# Patient Record
Sex: FEMALE | Race: WHITE | Employment: UNEMPLOYED | URBAN - METROPOLITAN AREA
[De-identification: names, ages, dates, MRNs, and addresses within clinical notes are randomized per-mention and may not be internally consistent; named-entity substitution may affect disease eponyms.]

---

## 2017-09-18 ENCOUNTER — HOSPITAL ENCOUNTER (INPATIENT)
Age: 32
LOS: 2 days | Discharge: HOME | DRG: 378 | End: 2017-09-20
Attending: EMERGENCY MEDICINE | Admitting: INTERNAL MEDICINE
Payer: SELF-PAY

## 2017-09-18 ENCOUNTER — APPOINTMENT (OUTPATIENT)
Dept: GENERAL RADIOLOGY | Age: 32
DRG: 378 | End: 2017-09-18
Attending: EMERGENCY MEDICINE
Payer: SELF-PAY

## 2017-09-18 PROBLEM — K92.2 UGIB (UPPER GASTROINTESTINAL BLEED): Status: ACTIVE | Noted: 2017-09-18

## 2017-09-18 PROBLEM — D64.9 ANEMIA: Status: ACTIVE | Noted: 2017-09-18

## 2017-09-18 PROBLEM — R55 SYNCOPE: Status: ACTIVE | Noted: 2017-09-18

## 2017-09-18 LAB
ALBUMIN SERPL-MCNC: 2.6 G/DL (ref 3.4–5)
ALBUMIN/GLOB SERPL: 0.9 {RATIO} (ref 0.8–1.7)
ALP SERPL-CCNC: 78 U/L (ref 45–117)
ALT SERPL-CCNC: 18 U/L (ref 13–56)
ANION GAP SERPL CALC-SCNC: 7 MMOL/L (ref 3–18)
AST SERPL-CCNC: 13 U/L (ref 15–37)
BASOPHILS # BLD: 0.1 K/UL (ref 0–0.1)
BASOPHILS # BLD: 0.1 K/UL (ref 0–0.1)
BASOPHILS NFR BLD: 1 % (ref 0–2)
BASOPHILS NFR BLD: 1 % (ref 0–2)
BILIRUB SERPL-MCNC: 0.2 MG/DL (ref 0.2–1)
BUN SERPL-MCNC: 24 MG/DL (ref 7–18)
BUN/CREAT SERPL: 27 (ref 12–20)
CALCIUM SERPL-MCNC: 7.4 MG/DL (ref 8.5–10.1)
CHLORIDE SERPL-SCNC: 107 MMOL/L (ref 100–108)
CO2 SERPL-SCNC: 25 MMOL/L (ref 21–32)
CREAT SERPL-MCNC: 0.9 MG/DL (ref 0.6–1.3)
DIFFERENTIAL METHOD BLD: ABNORMAL
DIFFERENTIAL METHOD BLD: ABNORMAL
EOSINOPHIL # BLD: 0.1 K/UL (ref 0–0.4)
EOSINOPHIL # BLD: 0.2 K/UL (ref 0–0.4)
EOSINOPHIL NFR BLD: 1 % (ref 0–5)
EOSINOPHIL NFR BLD: 2 % (ref 0–5)
ERYTHROCYTE [DISTWIDTH] IN BLOOD BY AUTOMATED COUNT: 14.8 % (ref 11.6–14.5)
ERYTHROCYTE [DISTWIDTH] IN BLOOD BY AUTOMATED COUNT: 14.9 % (ref 11.6–14.5)
GLOBULIN SER CALC-MCNC: 3 G/DL (ref 2–4)
GLUCOSE SERPL-MCNC: 104 MG/DL (ref 74–99)
HCG UR QL: NEGATIVE
HCT VFR BLD AUTO: 20.8 % (ref 35–45)
HCT VFR BLD AUTO: 21.1 % (ref 35–45)
HCT VFR BLD AUTO: 21.5 % (ref 35–45)
HGB BLD-MCNC: 6.9 G/DL (ref 12–16)
HGB BLD-MCNC: 6.9 G/DL (ref 12–16)
HGB BLD-MCNC: 7 G/DL (ref 12–16)
LYMPHOCYTES # BLD: 2.6 K/UL (ref 0.9–3.6)
LYMPHOCYTES # BLD: 2.9 K/UL (ref 0.9–3.6)
LYMPHOCYTES NFR BLD: 21 % (ref 21–52)
LYMPHOCYTES NFR BLD: 27 % (ref 21–52)
MCH RBC QN AUTO: 27.6 PG (ref 24–34)
MCH RBC QN AUTO: 27.8 PG (ref 24–34)
MCHC RBC AUTO-ENTMCNC: 32.6 G/DL (ref 31–37)
MCHC RBC AUTO-ENTMCNC: 32.7 G/DL (ref 31–37)
MCV RBC AUTO: 84.4 FL (ref 74–97)
MCV RBC AUTO: 85.3 FL (ref 74–97)
MONOCYTES # BLD: 0.3 K/UL (ref 0.05–1.2)
MONOCYTES # BLD: 0.4 K/UL (ref 0.05–1.2)
MONOCYTES NFR BLD: 2 % (ref 3–10)
MONOCYTES NFR BLD: 4 % (ref 3–10)
NEUTS SEG # BLD: 10.7 K/UL (ref 1.8–8)
NEUTS SEG # BLD: 6.6 K/UL (ref 1.8–8)
NEUTS SEG NFR BLD: 66 % (ref 40–73)
NEUTS SEG NFR BLD: 75 % (ref 40–73)
PLATELET # BLD AUTO: 53 K/UL (ref 135–420)
PLATELET # BLD AUTO: 54 K/UL (ref 135–420)
PMV BLD AUTO: 11.5 FL (ref 9.2–11.8)
PMV BLD AUTO: 11.5 FL (ref 9.2–11.8)
POTASSIUM SERPL-SCNC: 3.4 MMOL/L (ref 3.5–5.5)
PROT SERPL-MCNC: 5.6 G/DL (ref 6.4–8.2)
RBC # BLD AUTO: 2.5 M/UL (ref 4.2–5.3)
RBC # BLD AUTO: 2.52 M/UL (ref 4.2–5.3)
SODIUM SERPL-SCNC: 139 MMOL/L (ref 136–145)
WBC # BLD AUTO: 14.1 K/UL (ref 4.6–13.2)
WBC # BLD AUTO: 9.9 K/UL (ref 4.6–13.2)

## 2017-09-18 PROCEDURE — 93005 ELECTROCARDIOGRAM TRACING: CPT

## 2017-09-18 PROCEDURE — 80053 COMPREHEN METABOLIC PANEL: CPT | Performed by: EMERGENCY MEDICINE

## 2017-09-18 PROCEDURE — 86900 BLOOD TYPING SEROLOGIC ABO: CPT | Performed by: EMERGENCY MEDICINE

## 2017-09-18 PROCEDURE — 74011250636 HC RX REV CODE- 250/636: Performed by: EMERGENCY MEDICINE

## 2017-09-18 PROCEDURE — 81025 URINE PREGNANCY TEST: CPT | Performed by: EMERGENCY MEDICINE

## 2017-09-18 PROCEDURE — C9113 INJ PANTOPRAZOLE SODIUM, VIA: HCPCS | Performed by: EMERGENCY MEDICINE

## 2017-09-18 PROCEDURE — 99285 EMERGENCY DEPT VISIT HI MDM: CPT

## 2017-09-18 PROCEDURE — 85018 HEMOGLOBIN: CPT | Performed by: INTERNAL MEDICINE

## 2017-09-18 PROCEDURE — 65660000000 HC RM CCU STEPDOWN

## 2017-09-18 PROCEDURE — 85025 COMPLETE CBC W/AUTO DIFF WBC: CPT | Performed by: EMERGENCY MEDICINE

## 2017-09-18 PROCEDURE — 71010 XR CHEST PORT: CPT

## 2017-09-18 PROCEDURE — 86920 COMPATIBILITY TEST SPIN: CPT | Performed by: EMERGENCY MEDICINE

## 2017-09-18 RX ORDER — DEXTROAMPHETAMINE SACCHARATE, AMPHETAMINE ASPARTATE, DEXTROAMPHETAMINE SULFATE AND AMPHETAMINE SULFATE 7.5; 7.5; 7.5; 7.5 MG/1; MG/1; MG/1; MG/1
30 TABLET ORAL DAILY
Status: DISCONTINUED | OUTPATIENT
Start: 2017-09-19 | End: 2017-09-19

## 2017-09-18 RX ORDER — SODIUM CHLORIDE 0.9 % (FLUSH) 0.9 %
5-10 SYRINGE (ML) INJECTION EVERY 8 HOURS
Status: DISCONTINUED | OUTPATIENT
Start: 2017-09-18 | End: 2017-09-20 | Stop reason: HOSPADM

## 2017-09-18 RX ORDER — SODIUM CHLORIDE 0.9 % (FLUSH) 0.9 %
5-10 SYRINGE (ML) INJECTION AS NEEDED
Status: DISCONTINUED | OUTPATIENT
Start: 2017-09-18 | End: 2017-09-20 | Stop reason: HOSPADM

## 2017-09-18 RX ORDER — PANTOPRAZOLE SODIUM 40 MG/10ML
40 INJECTION, POWDER, LYOPHILIZED, FOR SOLUTION INTRAVENOUS
Status: COMPLETED | OUTPATIENT
Start: 2017-09-18 | End: 2017-09-18

## 2017-09-18 RX ORDER — ONDANSETRON 2 MG/ML
4 INJECTION INTRAMUSCULAR; INTRAVENOUS
Status: DISCONTINUED | OUTPATIENT
Start: 2017-09-18 | End: 2017-09-20 | Stop reason: HOSPADM

## 2017-09-18 RX ORDER — VENLAFAXINE HYDROCHLORIDE 75 MG/1
75 CAPSULE, EXTENDED RELEASE ORAL
Status: DISCONTINUED | OUTPATIENT
Start: 2017-09-19 | End: 2017-09-20 | Stop reason: HOSPADM

## 2017-09-18 RX ADMIN — Medication 10 ML: at 22:00

## 2017-09-18 RX ADMIN — PANTOPRAZOLE SODIUM 40 MG: 40 INJECTION, POWDER, FOR SOLUTION INTRAVENOUS at 20:45

## 2017-09-18 NOTE — IP AVS SNAPSHOT
[address] Patient: Kristine Garcia MRN: [id number] :1985 Current Discharge Medication List  
  
START taking these medications Dose & Instructions Dispensing Information Comments Morning Noon Evening Bedtime  
 ascorbic acid (vitamin C) 500 mg tablet Commonly known as:  VITAMIN C Your last dose was: Your next dose is:    
   
   
 Dose:  500 mg Take 1 Tab by mouth daily for 30 days. Quantity:  30 Tab Refills:  0  
     
   
   
   
  
 ferrous sulfate 325 mg (65 mg iron) tablet Your last dose was: Your next dose is:    
   
   
 Dose:  325 mg Take 1 Tab by mouth two (2) times daily (with meals) for 30 days. Quantity:  60 Tab Refills:  0  
     
   
   
   
  
 * OTHER Your last dose was: Your next dose is: CBC in 1 week Dx: Fax results to Dr. Monik Estrella Quantity:  1 Each Refills:  0  
     
   
   
   
  
 * OTHER Your last dose was: Your next dose is: This is to certify that Kristine Garcia was admitted to [facility] from 2017 to 17. He may may return to work on 17. Please feel free to contact my office if you have any questions or concerns. Thank you. Quantity:  1 Each Refills:  0  
     
   
   
   
  
 pantoprazole 40 mg tablet Commonly known as:  PROTONIX Your last dose was: Your next dose is:    
   
   
 Dose:  40 mg Take 1 Tab by mouth Before breakfast and dinner. Quantity:  60 Tab Refills:  0  
     
   
   
   
  
 sucralfate 100 mg/mL suspension Commonly known as:  Allyson Gum Your last dose was: Your next dose is:    
   
   
 Dose:  1 g Take 10 mL by mouth four (4) times daily. Quantity:  414 mL Refills:  2  
     
   
   
   
  
 * Notice:   This list has 2 medication(s) that are the same as other medications prescribed for you. Read the directions carefully, and ask your doctor or other care provider to review them with you. CONTINUE these medications which have NOT CHANGED Dose & Instructions Dispensing Information Comments Morning Noon Evening Bedtime ADDERALL 30 mg tablet Generic drug:  dextroamphetamine-amphetamine Your last dose was: Your next dose is:    
   
   
 Dose:  30 mg Take 30 mg by mouth. Refills:  0  
     
   
   
   
  
 venlafaxine-SR 75 mg capsule Commonly known as:  EFFEXOR-XR Your last dose was: Your next dose is:    
   
   
  Refills:  5 VITAMIN D3 2,000 unit Tab Generic drug:  cholecalciferol (vitamin D3) Your last dose was: Your next dose is: Take  by mouth. Refills:  0 Where to Get Your Medications Information on where to get these meds will be given to you by the nurse or doctor. ! Ask your nurse or doctor about these medications  
  ascorbic acid (vitamin C) 500 mg tablet  
 ferrous sulfate 325 mg (65 mg iron) tablet OTHER  
 OTHER  
 pantoprazole 40 mg tablet  
 sucralfate 100 mg/mL suspension

## 2017-09-18 NOTE — ED PROVIDER NOTES
HPI Comments: Kacie Crowder is a 27 y.o. Female who presents to the ED with c/o dizziness at work earlier today. Patient states a patient upset her at work when she began experiencing palpitations. Notes she walked to her car to calm herself down when she realized she had left the keys in her office and had a near-syncopal event as she was walking back to retrieve her keys. Denies LOC. Also reports diaphoresis and states she had difficulty keeping her eyes open. Denies CP, SOB, abdominal pain or hx of cardiac dz. Denies FMHx of sudden cardiac death. No other symptoms or concerns were expressed. The history is provided by the patient. Past Medical History:   Diagnosis Date    Contact dermatitis and other eczema, due to unspecified cause 2011    psoriasis    Dental caries     Psychiatric disorder     ADHD       Past Surgical History:   Procedure Laterality Date    HX CHOLECYSTECTOMY  2014    gallstones    HX GYN  2011    LEEP for mild displasia         Family History:   Problem Relation Age of Onset    Stroke Mother     Hypertension Mother     Elevated Lipids Mother     Dementia Mother     Hypertension Father     Elevated Lipids Father     No Known Problems Maternal Grandmother     No Known Problems Maternal Grandfather     No Known Problems Paternal Grandmother     No Known Problems Paternal Grandfather        Social History     Social History    Marital status:      Spouse name: N/A    Number of children: N/A    Years of education: N/A     Occupational History    Not on file.      Social History Main Topics    Smoking status: Current Every Day Smoker     Packs/day: 0.25     Years: 17.00    Smokeless tobacco: Never Used    Alcohol use Yes      Comment: social    Drug use: No    Sexual activity: Yes     Partners: Male     Birth control/ protection: None     Other Topics Concern    Not on file     Social History Narrative         ALLERGIES: Penicillins    Review of Systems Constitutional: Positive for diaphoresis. Respiratory: Negative for shortness of breath. Cardiovascular: Negative for chest pain. Gastrointestinal: Negative for abdominal pain. Neurological: Positive for dizziness. Vitals:    09/18/17 1412   BP: 124/84   Pulse: (!) 102   Resp: 18   Temp: 97.3 °F (36.3 °C)   SpO2: 100%            Physical Exam   Constitutional: She is oriented to person, place, and time. She appears well-developed. HENT:   Head: Normocephalic and atraumatic. Eyes: EOM are normal. Pupils are equal, round, and reactive to light. Neck: Normal range of motion. Neck supple. Cardiovascular: Normal rate, regular rhythm and normal heart sounds. Exam reveals no friction rub. No murmur heard. Pulmonary/Chest: Effort normal and breath sounds normal. No respiratory distress. She has no wheezes. Abdominal: Soft. She exhibits no distension. There is no tenderness. There is no rebound and no guarding. Musculoskeletal: Normal range of motion. Neurological: She is alert and oriented to person, place, and time. Skin: Skin is warm and dry. Psychiatric: She has a normal mood and affect. Her behavior is normal. Thought content normal.        MDM  Number of Diagnoses or Management Options  Diagnosis management comments:   EKG showed sinus rhythm with normal axis normal intervals no ST elevation or depression or hypertrophy. 27year-old female presyncope. He had some prodromal diaphoresis lightheadedness. No significant chest pain service with abdominal pain or headache. No fall. Hg 7. Last blood work 2015. Platelet 53. Discussed the patient she denies any heavy periods. She does still get her periods. She did state that last week she had some abdominal pain and 2 days later was followed by some very dark and tarry stool was also possible causes. Tarry stool stopped. Repeat labs no change. No transfusion at this time.   obs and trend hg.     D/w Divina, he did reqeust Transfusion. I spoke with the patient,  She walks without any problems or weakness. She does her activities of daily living  without difficulty;  syncope may be related to presence of a vasovagal event. I do think she is having symptomatic anemia. Will hold on transfer . rectal: trace stool Heme +;     ED Course       Procedures        Scribe Attestation      Jana James acting as a scribe for and in the presence of Ronal Santillan MD      September 18, 2017 at 2:19 PM       Provider Attestation:      I personally performed the services described in the documentation, reviewed the documentation, as recorded by the scribe in my presence, and it accurately and completely records my words and actions.  September 18, 2017 at 2:19 PM - Ronal Santillan MD

## 2017-09-18 NOTE — IP AVS SNAPSHOT
[address] Patient: Silvia Hannah MRN: [id number] :1985 You are allergic to the following Allergen Reactions Penicillins Hives Recent Documentation Height Weight Breastfeeding? BMI OB Status Smoking Status 1.651 m 102.5 kg No 37.61 kg/m2 Having regular periods Current Every Day Smoker Unresulted Labs Order Current Status PERIPHERAL SMEAR In process TYPE & SCREEN Preliminary result Emergency Contacts Name Discharge Info Relation Home Work Mobile Cam Talbert DISCHARGE CAREGIVER [3] Friend [5] [phone] About your hospitalization You were admitted on:  2017 You last received care in the:  [address] [place] [address] You were discharged on:  2017 Unit phone number:  [phone] Why you were hospitalized Your primary diagnosis was:  Not on File Your diagnoses also included:  Syncope, Anemia, Ugib (Upper Gastrointestinal Bleed) Providers Seen During Your Hospitalizations Provider Role Specialty Primary office phone Barbaraann Mortimer, MD Attending Provider Emergency Medicine [phone] Kelle Walters MD Attending Provider Internal Medicine [phone] Jabari Owusu MD Attending Provider [facility] [phone] Your Primary Care Physician (PCP) Primary Care Physician Office Phone Office Fax NONE ** None ** ** None ** Follow-up Information Follow up With Details Comments Contact Info Deyanira Madison MD Schedule an appointment as soon as possible for a visit on 10/26/2017 @0940am [address] [place] [address] Drive [address] [facility] [address] 
[phone] Jordyn Lee MD Schedule an appointment as soon as possible for a visit on 10/23/2017 @1045AM  27 [facility] [address] [facility] [address] [phone] Francisco Arriaga NP On 9/27/2017 @0930am 16 [facility] [address] [address] 
[phone] Your Appointments Wednesday September 27, 2017 10:00 AM EDT New Patient with Francisco Arriaga  Aldo Wang (XIMENA Yoder)  
 16 [facility] [address] [id number] [phone] Current Discharge Medication List  
  
START taking these medications Dose & Instructions Dispensing Information Comments Morning Noon Evening Bedtime  
 ascorbic acid (vitamin C) 500 mg tablet Commonly known as:  VITAMIN C Your last dose was: Your next dose is:    
   
   
 Dose:  500 mg Take 1 Tab by mouth daily for 30 days. Quantity:  30 Tab Refills:  0  
     
   
   
   
  
 ferrous sulfate 325 mg (65 mg iron) tablet Your last dose was: Your next dose is:    
   
   
 Dose:  325 mg Take 1 Tab by mouth two (2) times daily (with meals) for 30 days. Quantity:  60 Tab Refills:  0  
     
   
   
   
  
 * OTHER Your last dose was: Your next dose is: CBC in 1 week Dx: Fax results to Dr. Lori Richard Quantity:  1 Each Refills:  0  
     
   
   
   
  
 * OTHER Your last dose was: Your next dose is: This is to certify that Eula Velasquez was admitted to [facility] from 9/18/2017 to 09/20/17. He may may return to work on 9/22/17. Please feel free to contact my office if you have any questions or concerns. Thank you. Quantity:  1 Each Refills:  0  
     
   
   
   
  
 pantoprazole 40 mg tablet Commonly known as:  PROTONIX Your last dose was: Your next dose is:    
   
   
 Dose:  40 mg Take 1 Tab by mouth Before breakfast and dinner. Quantity:  60 Tab Refills:  0  
     
   
   
   
  
 sucralfate 100 mg/mL suspension Commonly known as:  Doreatha Fede Your last dose was: Your next dose is:    
   
   
 Dose:  1 g Take 10 mL by mouth four (4) times daily. Quantity:  414 mL Refills:  2  
     
   
   
   
  
 * Notice: This list has 2 medication(s) that are the same as other medications prescribed for you. Read the directions carefully, and ask your doctor or other care provider to review them with you. CONTINUE these medications which have NOT CHANGED Dose & Instructions Dispensing Information Comments Morning Noon Evening Bedtime ADDERALL 30 mg tablet Generic drug:  dextroamphetamine-amphetamine Your last dose was: Your next dose is:    
   
   
 Dose:  30 mg Take 30 mg by mouth. Refills:  0  
     
   
   
   
  
 venlafaxine-SR 75 mg capsule Commonly known as:  EFFEXOR-XR Your last dose was: Your next dose is:    
   
   
  Refills:  5 VITAMIN D3 2,000 unit Tab Generic drug:  cholecalciferol (vitamin D3) Your last dose was: Your next dose is: Take  by mouth. Refills:  0 Where to Get Your Medications Information on where to get these meds will be given to you by the nurse or doctor. ! Ask your nurse or doctor about these medications  
  ascorbic acid (vitamin C) 500 mg tablet  
 ferrous sulfate 325 mg (65 mg iron) tablet OTHER  
 OTHER  
 pantoprazole 40 mg tablet  
 sucralfate 100 mg/mL suspension Discharge Instructions Anemia: Care Instructions Your Care Instructions Anemia is a low level of red blood cells, which carry oxygen throughout your body. Many things can cause anemia. Lack of iron is one of the most common causes. Your body needs iron to make hemoglobin, a substance in red blood cells that carries oxygen from the lungs to your body's cells. Without enough iron, the body produces fewer and smaller red blood cells. As a result, your body's cells do not get enough oxygen, and you feel tired and weak. And you may have trouble concentrating. Bleeding is the most common cause of a lack of iron. You may have heavy menstrual bleeding or bleeding caused by conditions such as ulcers, hemorrhoids, or cancer. Regular use of aspirin or other anti-inflammatory medicines (such as ibuprofen) also can cause bleeding in some people. A lack of iron in your diet also can cause anemia, especially at times when the body needs more iron, such as during pregnancy, infancy, and the teen years. Your doctor may have prescribed iron pills. It may take several months of treatment for your iron levels to return to normal. Your doctor also may suggest that you eat foods that are rich in iron, such as meat and beans. There are many other causes of anemia. It is not always due to a lack of iron. Finding the specific cause of your anemia will help your doctor find the right treatment for you. Follow-up care is a key part of your treatment and safety. Be sure to make and go to all appointments, and call your doctor if you are having problems. It's also a good idea to know your test results and keep a list of the medicines you take. How can you care for yourself at home? · Take your medicines exactly as prescribed. Call your doctor if you think you are having a problem with your medicine. · If your doctor recommends iron pills, take them as directed: ¨ Try to take the pills on an empty stomach about 1 hour before or 2 hours after meals. But you may need to take iron with food to avoid an upset stomach. ¨ Do not take antacids or drink milk or caffeine drinks (such as coffee, tea, or cola) at the same time or within 2 hours of the time that you take your iron. They can make it hard for your body to absorb the iron. ¨ Vitamin C (from food or supplements) helps your body absorb iron.  Try taking iron pills with a glass of orange juice or some other food that is high in vitamin C, such as citrus fruits. ¨ Iron pills may cause stomach problems, such as heartburn, nausea, diarrhea, constipation, and cramps. Be sure to drink plenty of fluids, and include fruits, vegetables, and fiber in your diet each day. Iron pills often make your bowel movements dark or green. ¨ If you forget to take an iron pill, do not take a double dose of iron the next time you take a pill. ¨ Keep iron pills out of the reach of small children. An overdose of iron can be very dangerous. · Follow your doctor's advice about eating iron-rich foods. These include red meat, shellfish, poultry, eggs, beans, raisins, whole-grain bread, and leafy green vegetables. · Steam vegetables to help them keep their iron content. When should you call for help? Call 911 anytime you think you may need emergency care. For example, call if: 
· You have symptoms of a heart attack. These may include: ¨ Chest pain or pressure, or a strange feeling in the chest. 
¨ Sweating. ¨ Shortness of breath. ¨ Nausea or vomiting. ¨ Pain, pressure, or a strange feeling in the back, neck, jaw, or upper belly or in one or both shoulders or arms. ¨ Lightheadedness or sudden weakness. ¨ A fast or irregular heartbeat. After you call 911, the  may tell you to chew 1 adult-strength or 2 to 4 low-dose aspirin. Wait for an ambulance. Do not try to drive yourself. · You passed out (lost consciousness). Call your doctor now or seek immediate medical care if: 
· You have new or increased shortness of breath. · You are dizzy or lightheaded, or you feel like you may faint. · Your fatigue and weakness continue or get worse. · You have any abnormal bleeding, such as: 
¨ Nosebleeds. ¨ Vaginal bleeding that is different (heavier, more frequent, at a different time of the month) than what you are used to. ¨ Bloody or black stools, or rectal bleeding. ¨ Bloody or pink urine. Watch closely for changes in your health, and be sure to contact your doctor if: 
· You do not get better as expected. Where can you learn more? Go to http://zeke-néstor.info/. Enter R301 in the search box to learn more about \"Anemia: Care Instructions. \" Current as of: October 13, 2016 Content Version: 11.3 © 20064143-8840 ChinaPNR. Care instructions adapted under license by shopatplaces (which disclaims liability or warranty for this information). If you have questions about a medical condition or this instruction, always ask your healthcare professional. [place] [address] any warranty or liability for your use of this information. Gastrointestinal Bleeding: Care Instructions Your Care Instructions The digestive or gastrointestinal tract goes from the mouth to the anus. It is often called the GI tract. Bleeding can happen anywhere in the GI tract. It may be caused by an ulcer, an infection, or cancer. It may also be caused by medicines such as aspirin or ibuprofen. Light bleeding may not cause any symptoms at first. But if you continue to bleed for a while, you may feel very weak or tired. Sudden, heavy bleeding means you need to see a doctor right away. This kind of bleeding can be very dangerous. But it can usually be cured or controlled. The doctor may do some tests to find the cause of your bleeding. Follow-up care is a key part of your treatment and safety. Be sure to make and go to all appointments, and call your doctor if you are having problems. It's also a good idea to know your test results and keep a list of the medicines you take. How can you care for yourself at home? · Be safe with medicines. Take your medicines exactly as prescribed. Call your doctor if you think you are having a problem with your medicine. You will get more details on the specific medicines your doctor prescribes. · Do not take aspirin or other anti-inflammatory medicines, such as naproxen (Aleve) or ibuprofen (Advil, Motrin), without talking to your doctor first. Ask your doctor if it is okay to use acetaminophen (Tylenol). · Do not drink alcohol. · The bleeding may make you lose iron. So it's important to eat foods that have a lot of iron. These include red meat, shellfish, poultry, and eggs. They also include beans, raisins, whole-grain breads, and leafy green vegetables. If you want help planning meals, you can make an appointment with a dietitian. When should you call for help? Call 911 anytime you think you may need emergency care. For example, call if: 
· You have sudden, severe belly pain. · You vomit blood or what looks like coffee grounds. · You passed out (lost consciousness). · Your stools are maroon or very bloody. Call your doctor now or seek immediate medical care if: 
· You are dizzy or lightheaded, or you feel like you may faint. · Your stools are black and look like tar, or they have streaks of blood. · You have belly pain. · You vomit or have nausea. · You have trouble swallowing, or it hurts when you swallow. Watch closely for changes in your health, and be sure to contact your doctor if: 
· You do not get better as expected. Where can you learn more? Go to http://zeke-néstor.info/. Enter L412 in the search box to learn more about \"Gastrointestinal Bleeding: Care Instructions. \" Current as of: March 20, 2017 Content Version: 11.3 © 4146-4475 WellTek. Care instructions adapted under license by Meetingsbooker.com (which disclaims liability or warranty for this information). If you have questions about a medical condition or this instruction, always ask your healthcare professional. [address] any warranty or liability for your use of this information. Fainting: Care Instructions Your Care Instructions When you faint, or pass out, you lose consciousness for a short time. A brief drop in blood flow to the brain often causes it. When you fall or lie down, more blood flows to your brain and you regain consciousness. Emotional stress, pain, or overheatingespecially if you have been standingcan make you faint. In these cases, fainting is usually not serious. But fainting can be a sign of a more serious problem. Your doctor may want you to have more tests to rule out other causes. The treatment you need depends on the reason why you fainted. The doctor has checked you carefully, but problems can develop later. If you notice any problems or new symptoms, get medical treatment right away. Follow-up care is a key part of your treatment and safety. Be sure to make and go to all appointments, and call your doctor if you are having problems. It's also a good idea to know your test results and keep a list of the medicines you take. How can you care for yourself at home? · Drink plenty of fluids to prevent dehydration. If you have kidney, heart, or liver disease and have to limit fluids, talk with your doctor before you increase your fluid intake. When should you call for help? Call 911 anytime you think you may need emergency care. For example, call if: 
· You have symptoms of a heart problem. These may include: ¨ Chest pain or pressure. ¨ Severe trouble breathing. ¨ A fast or irregular heartbeat. ¨ Lightheadedness or sudden weakness. ¨ Coughing up pink, foamy mucus. ¨ Passing out. After you call 911, the  may tell you to chew 1 adult-strength or 2 to 4 low-dose aspirin. Wait for an ambulance. Do not try to drive yourself. · You have symptoms of a stroke. These may include: 
¨ Sudden numbness, tingling, weakness, or loss of movement in your face, arm, or leg, especially on only one side of your body. ¨ Sudden vision changes. ¨ Sudden trouble speaking. ¨ Sudden confusion or trouble understanding simple statements. ¨ Sudden problems with walking or balance. ¨ A sudden, severe headache that is different from past headaches. · You passed out (lost consciousness) again. Watch closely for changes in your health, and be sure to contact your doctor if: 
· You do not get better as expected. Where can you learn more? Go to http://zeke-néstor.info/. Enter T246 in the search box to learn more about \"Fainting: Care Instructions. \" Current as of: 2017 Content Version: 11.3 © 8426-5285 FolderBoy. Care instructions adapted under license by Pyramid Analytics (which disclaims liability or warranty for this information). If you have questions about a medical condition or this instruction, always ask your healthcare professional. [address] any warranty or liability for your use of this information. Patient armband removed and shredded CoupOptionhart Activation Thank you for requesting access to Design[employer]west Platforms. Please follow the instructions below to securely access and download your online medical record. Design[employer] Platforms allows you to send messages to your doctor, view your test results, renew your prescriptions, schedule appointments, and more. How Do I Sign Up? 1. In your internet browser, go to [email or web address] 
2. Click on the First Time User? Click Here link in the Sign In box. You will be redirect to the New Member Sign Up page. 3. Enter your Design[employer] Platforms Access Code exactly as it appears below. You will not need to use this code after youve completed the sign-up process. If you do not sign up before the expiration date, you must request a new code. [employer] Platforms Access Code: [email or web address] Expires: 2017 11:03 AM (This is the date your [employer] Platform[employer] access code will ) 4.  Enter the last four digits of your Social Security Number (xxxx) and Date of Birth (mm/dd/yyyy) as indicated and click Submit. You will be taken to the next sign-up page. 5. Create a CrowdTangle ID. This will be your CrowdTangle login ID and cannot be changed, so think of one that is secure and easy to remember. 6. Create a CrowdTangle password. You can change your password at any time. 7. Enter your Password Reset Question and Answer. This can be used at a later time if you forget your password. 8. Enter your e-mail address. You will receive e-mail notification when new information is available in [address] 9. Click Sign Up. You can now view and download portions of your medical record. 10. Click the Download Summary menu link to download a portable copy of your medical information. Additional Information If you have questions, please visit the Frequently Asked Questions section of the CrowdTangle website at [email or web address] Remember, CrowdTangle is NOT to be used for urgent needs. For medical emergencies, dial 911. DISCHARGE SUMMARY from Nurse The following personal items are in your possession at time of discharge: 
 
Dental Appliances: None Visual Aid: None Home Medications: None Jewelry: None Clothing: Footwear, Pants, Shirt Other Valuables: Cell Phone PATIENT INSTRUCTIONS: 
 
 
F-face looks uneven A-arms unable to move or move unevenly S-speech slurred or non-existent T-time-call 911 as soon as signs and symptoms begin-DO NOT go Back to bed or wait to see if you get better-TIME IS BRAIN. Warning Signs of HEART ATTACK Call 911 if you have these symptoms: ? Chest discomfort. Most heart attacks involve discomfort in the center of the chest that lasts more than a few minutes, or that goes away and comes back. It can feel like uncomfortable pressure, squeezing, fullness, or pain. ? Discomfort in other areas of the upper body. Symptoms can include pain or discomfort in one or both arms, the back, neck, jaw, or stomach. ? Shortness of breath with or without chest discomfort. ? Other signs may include breaking out in a cold sweat, nausea, or lightheadedness. Don't wait more than five minutes to call [address]! Fast action can save your life. Calling 911 is almost always the fastest way to get lifesaving treatment. Emergency Medical Services staff can begin treatment when they arrive  up to an hour sooner than if someone gets to the hospital by car. The discharge information has been reviewed with the patient. The patient verbalized understanding. Discharge medications reviewed with the patient and appropriate educational materials and side effects teaching were provided. Discharge Instructions Patient: Megan Hoang MRN: [id number]  CSN: [id number] YOB: 1985  Age: 27 y.o. Sex: female DOA: 9/18/2017 LOS:  LOS: 2 days   Discharge Date: DIET:  Regular Diet ACTIVITY: Activity as tolerated ADDITIONAL INFORMATION: If you experience any of the following symptoms but not limited to Fever, chills, nausea, vomiting, diarrhea, change in mentation, falling, bleeding, shortness of breath, chest pain, please call your primary care physician or return to the emergency room if you cannot get hold of your doctor:  
 
FOLLOW UP CARE: 
Dr. Grabiel Carranza in 7-10 days. Please call and set up an appointment. Dr. Emmy Hernandes, hematology in 1 week Dr. Emmy Hernandes, GI in 4 week Cristo Reich MD 
9/20/2017 11:16 AM 
 
 
 
 
 
Discharge Instructions Attachments/References PANTOPRAZOLE (BY MOUTH) (ENGLISH) ASCORBIC ACID (VITAMIN C) (BY MOUTH) (ENGLISH) IRON SUPPLEMENTS (BY MOUTH) (ENGLISH) SUCRALFATE (BY MOUTH) (ENGLISH) Discharge Orders None [facility] Announcement We are excited to announce that we are making your provider's discharge notes available to you in Microarrays. You will see these notes when they are completed and signed by the physician that discharged you from your recent hospital stay. If you have any questions or concerns about any information you see in [employer] Energyt, please call the Health Information Department where you were seen or reach out to your Primary Care Provider for more information about your plan of care. Introducing [facility]! Luis M Singer introduces Microarrays patient portal. Now you can access parts of your medical record, email your doctor's office, and request medication refills online. 1. In your internet browser, go to [email or web address] 2. Click on the First Time User? Click Here link in the Sign In box. You will see the New Member Sign Up page. 3. Enter your Microarrays Access Code exactly as it appears below. You will not need to use this code after youve completed the sign-up process. If you do not sign up before the expiration date, you must request a new code. · Microarrays Access Code: [email or web address] Expires: 12/19/2017 11:03 AM 
 
4. Enter the last four digits of your Social Security Number (xxxx) and Date of Birth (mm/dd/yyyy) as indicated and click Submit. You will be taken to the next sign-up page. 5. Create a Laricina Energyt ID. This will be your Microarrays login ID and cannot be changed, so think of one that is secure and easy to remember. 6. Create a Microarrays password. You can change your password at any time. 7. Enter your Password Reset Question and Answer. This can be used at a later time if you forget your password. 8. Enter your e-mail address.  You will receive e-mail notification when new information is available in [email or web address]. 9. Click Sign Up. You can now view and download portions of your medical record. 10. Click the Download Summary menu link to download a portable copy of your medical information. If you have questions, please visit the Frequently Asked Questions section of the [email or web address] website. Remember, SADAR 3Dt is NOT to be used for urgent needs. For medical emergencies, dial 911. Now available from your iPhone and Android! General Information Please provide this summary of care documentation to your next provider. Patient Signature:  ____________________________________________________________ Date:  ____________________________________________________________  
  
Velma Hua Provider Signature:  ____________________________________________________________ Date:  ____________________________________________________________ More Information Pantoprazole (By mouth) Pantoprazole (pan-TOE-pra-zole) Treats gastroesophageal reflux disease (GERD), a damaged esophagus, and high levels of stomach acid. This medicine is a proton pump inhibitor (PPI). Brand Name(s): Protonix There may be other brand names for this medicine. When This Medicine Should Not Be Used: This medicine is not right for everyone. Do not use it if you had an allergic reaction to pantoprazole or similar medicines. How to Use This Medicine:  
Packet, Tablet, Delayed Release Tablet, Long Acting Tablet · Your doctor will tell you how much medicine to use. Do not use more than directed. Take the medicine at least 30 minutes before a meal. 
· Delayed-release tablet: Swallow the tablet whole. Do not crush, break, or chew it. · Delayed-release packet: ¨ To prepare with applesauce: § Mix the packet contents with 1 teaspoon of applesauce. Do not mix with water, or other liquids or food. Do not divide the packet contents to make smaller doses. § Swallow the mixture within 10 minutes after you mix it. Do not chew or crush the granules. § Sip some water after you take the mixture to make sure you swallow all of the medicine. ¨ To prepare with apple juice: § Mix the packet contents with 1 teaspoon of apple juice in a small cup. Do not divide the packet contents to make smaller doses. § Stir for 5 seconds and drink the mixture immediately. Do not chew or crush the granules. § To make sure you get all of the medicine, add more apple juice to the cup. Drink it immediately. ¨ To prepare for a feeding tube: § Pour the packet contents in a 2-ounce (60 milliliter [mL]) catheter-tip syringe. § Add 10 mL of apple juice to the syringe. Add the mixture to the tube. Gently tap or shake the barrel of the syringe to help empty it. § Add 10 mL of apple juice to the syringe and put it in the tube. Do this at least 2 times. There should be no granules left in the syringe. · This medicine should come with a Medication Guide. Ask your pharmacist for a copy if you do not have one. · Missed dose: Take a dose as soon as you remember. If it is almost time for your next dose, wait until then and take a regular dose. Do not take extra medicine to make up for a missed dose. · Store the medicine in a closed container at room temperature, away from heat, moisture, and direct light. Drugs and Foods to Avoid: Ask your doctor or pharmacist before using any other medicine, including over-the-counter medicines, vitamins, and herbal products. · Some foods and medicines can affect how pantoprazole works. Tell your doctor if you are using any of the following: ¨ Ampicillin, atazanavir, digoxin, erlotinib, ketoconazole, methotrexate, mycophenolate mofetil, nelfinavir ¨ Blood thinner (including warfarin) ¨ Diuretic (water pill) ¨ Iron supplements Warnings While Using This Medicine: · Tell your doctor if you are pregnant or breastfeeding, or if you have liver disease, lupus, or osteoporosis. · This medicine may cause the following problems: ¨ Kidney problems ¨ Low vitamin B12 or magnesium levels ¨ Increased risk of broken bones in the hip, wrist, or spine · This medicine can cause diarrhea. Call your doctor if the diarrhea becomes severe, does not stop, or is bloody. Do not take any medicine to stop diarrhea until you have talked to your doctor. Diarrhea can occur 2 months or more after you stop taking this medicine. · Tell any doctor or dentist who treats you that you are using this medicine. This medicine may affect certain medical test results. · Your doctor will check your progress and the effects of this medicine at regular visits. Keep all appointments. · Keep all medicine out of the reach of children. Never share your medicine with anyone. Possible Side Effects While Using This Medicine:  
Call your doctor right away if you notice any of these side effects: · Allergic reaction: Itching or hives, swelling in your face or hands, swelling or tingling in your mouth or throat, chest tightness, trouble breathing · Blistering, peeling, red skin rash · Fever, joint pain, swelling in your body, unusual weight gain, change in how much or how often you urinate · Joint pain, rash on your cheeks or arms that gets worse in the sun · Seizures, dizziness, uneven heartbeat, muscle cramps or twitching · Severe diarrhea, stomach cramps, fever · Stomach pain, nausea, vomiting, weight loss If you notice other side effects that you think are caused by this medicine, tell your doctor. Call your doctor for medical advice about side effects. You may report side effects to FDA at 8-816-FDA-7918 © 2017 [address] Information is for End User's use only and may not be sold, redistributed or otherwise used for commercial purposes. The above information is an  only.  It is not intended as medical advice for individual conditions or treatments. Talk to your doctor, nurse or pharmacist before following any medical regimen to see if it is safe and effective for you. Ascorbic Acid (Vitamin C) (By mouth) Ascorbic Acid (as-KOREector AS-id) Helps patients who do not have enough vitamin C in the body. It is sometimes used to add acidity to the urine. Brand Name(s): Ascocid, C-500, C-Time w/Gege Hips, [facility] Great Taste Vitamin C, Bud-C, Nature's Blend Vitamin C, One-Gram C, PharmAssure Vitamin C, [facility] Vitamin C, Vitamin C250 There may be other brand names for this medicine. When This Medicine Should Not Be Used: You should not use this medicine if you have had an allergic reaction to ascorbic acid (vitamin C). How to Use This Medicine:  
Tablet, Powder, Chewable Tablet, Long Acting Tablet, Long Acting Capsule, Liquid · Your doctor will tell you how much to take and how often. Always follow the dose instructions on the label if you take this medicine in over-the-counter form. · May be taken with or without food. · Swallow the tablet or capsule whole, do not chew or crush. · Chew the chewable tablet and swallow. · Carefully measure the oral liquid using a marked measuring spoon or medicine cup, or with the dropper that comes with the medicine. This medicine may either be mixed with foods such as cereal and fruit juice, or be dropped into the mouth. If a dose is missed: · Try not to miss any doses; however, it is usually not a problem if you miss one or two doses. How to Store and Dispose of This Medicine: · Store at room temperature in a closed container away from heat, moisture, and light. Do not freeze. · Keep all medicine away from children. Drugs and Foods to Avoid: Ask your doctor or pharmacist before using any other medicine, including over-the-counter medicines, vitamins, and herbal products. · Make sure your doctor knows if you are taking a blood thinner, such as Coumadin®. Warnings While Using This Medicine: · Check with your doctor before taking vitamin C if you are pregnant, breastfeeding, or have any medical problems. · Ascorbic acid may interfere with tests that measure sugar in the urine of patients with diabetes. Possible Side Effects While Using This Medicine: If you notice these less serious side effects, talk with your doctor: · Nausea and vomiting, stomach pain, diarrhea If you notice other side effects that you think are caused by this medicine, tell your doctor. Call your doctor for medical advice about side effects. You may report side effects to FDA at 6-153-FDA-5629 © 2017 [address] Information is for End User's use only and may not be sold, redistributed or otherwise used for commercial purposes. The above information is an  only. It is not intended as medical advice for individual conditions or treatments. Talk to your doctor, nurse or pharmacist before following any medical regimen to see if it is safe and effective for you. Iron Supplements (By mouth) Treats low blood iron or anemia by helping your body make red blood cells. Brand Name(s): Beef/Iron/Wine, Bifera, BiferaRx, Corvite FE, Duofer, EZFE 200, Enfamil Luis F-In-Sol, [facility] Iron Tablets, Fe-20, Femcon Fe, Femiron, Feosol, Luis F-Iron, Ester haute, [place] There may be other brand names for this medicine. When This Medicine Should Not Be Used: You should not use this medicine if you have had an allergic reaction to iron supplements, or if you have a condition called hemachromatosis (iron overload disease) or hemosiderosis (iron in the lungs), or any type of anemia that is not caused by iron deficiency. How to Use This Medicine:  
Liquid Filled Capsule, Coated Tablet, Tablet, Capsule, Chewable Tablet, Liquid, Long Acting Capsule, Long Acting Tablet · Your doctor will tell you how much of this medicine to take and how often. Do not take more medicine or take it more often than your doctor tells you to. Carefully follow your doctor's instructions about any special diet. · It is best to take this medicine on an empty stomach, 1 hour before or 2 hours after a meal. Take the medicine with a full glass or water or fruit juice. If the medicine upsets your stomach, you may take it with food. · The chewable tablet must be chewed or crushed before you swallow it. · Measure the oral liquid medicine with a marked measuring spoon or medicine cup. · The oral liquid may stain your teeth. These stains can be prevented by mixing the medicine with water or other liquids (such as fruit juice, tomato juice), and drinking the medicine with a straw. To remove any iron stains, brush your teeth with baking soda or peroxide. If a dose is missed: · If you miss a dose or forget to take your medicine, take it as soon as you can. If it is almost time for your next dose, wait until then to take the medicine and skip the missed dose. · Do not use extra medicine to make up for a missed dose. How to Store and Dispose of This Medicine: · Store the medicine at room temperature, away from heat, moisture, and direct light. · Keep all medicine away from children, and never share your medicine with anyone. Drugs and Foods to Avoid: Ask your doctor or pharmacist before using any other medicine, including over-the-counter medicines, vitamins, and herbal products. · Do not take iron supplements by mouth if you are also receiving iron injections. · Make sure your doctor knows if you are also using phenytoin (Dilantin®), acetohydroxamic acid (Lithostat®), or antibiotics such as demeclocycline, doxycycline (Vibramycin®), Cipro®, Levaquin®, minocycline, moxifloxacin (Avelox®), Tequin®, or tetracycline. · Tell your doctor if you are using antacids (such as Maalox® or Mylanta®). · Avoid the following foods, or eat them in small amounts at least 1 hour before or 2 hours after taking your iron: eggs, milk, cheese, yogurt, tea or coffee, whole-grain cereals, and breads. Warnings While Using This Medicine: · Make sure your doctor knows if you are pregnant or breastfeeding, or if you have stomach or intestinal problems, an active infection, diabetes, porphyria, or other medical problems. · Make sure any doctor or dentist who treats you knows that you are using this medicine. Iron may affect the results of certain medical tests. · Iron can cause your stools to be darker in color. This is normal and is not a cause for concern. Possible Side Effects While Using This Medicine:  
Call your doctor right away if you notice any of these side effects: · Bloody diarrhea · Bluish-colored lips, hands, or fingernails · Chest pain · Fever · Pale or clammy skin · Severe or continuing stomach cramps, vomiting (with or without blood) · Shallow breathing, weakness, weak but fast heartbeat If you notice these less serious side effects, talk with your doctor: · Constipation, diarrhea, nausea · Dark-colored urine · Leg cramps If you notice other side effects that you think are caused by this medicine, tell your doctor. Call your doctor for medical advice about side effects. You may report side effects to FDA at 9-549-FDA-6979 © 2017 [facility] Information is for End User's use only and may not be sold, redistributed or otherwise used for commercial purposes. The above information is an  only. It is not intended as medical advice for individual conditions or treatments. Talk to your doctor, nurse or pharmacist before following any medical regimen to see if it is safe and effective for you. Sucralfate (By mouth) Sucralfate ([id number]-NRIB-gqlv) Treats ulcers. Brand Name(s): Carafate There may be other brand names for this medicine. When This Medicine Should Not Be Used: You should not use this medicine if you have had an allergic reaction to it. How to Use This Medicine:  
Tablet, Liquid · Your doctor will tell you how much to take and how often. · Do not stop taking the medicine unless your doctor tells you to, even if you feel better. · Take your medicine on an empty stomach. Swallow the tablet with a glass of water. · Unless your doctor tells you otherwise, take the medicine 1 hour before meals and at bedtime. · Measure the oral liquid medicine using a measuring spoon or medicine cup. · Shake the oral liquid medicine well before using. If a dose is missed: · Take your medicine as soon as you remember that you missed your dose. · If it is nearly time for your next dose, wait until then to take your medicine and skip the missed dose. · You should not use two doses at one time. How to Store and Dispose of This Medicine:  
· Keep the medicine at room temperature, away from heat, light, and moisture. Do not freeze the oral liquid. · Keep all medicine out of the reach of children. Drugs and Foods to Avoid: Ask your doctor or pharmacist before using any other medicine, including over-the-counter medicines, vitamins, and herbal products. · Antacids may be taken one-half hour before or after taking sucralfate. · You should not take other medicines within 2 hours before or after taking sucralfate. If you need help deciding the best times to take your other medicines, ask your doctor or pharmacist. 
Warnings While Using This Medicine: · If you are pregnant or breastfeeding, talk to your doctor before taking this medicine. · Check with your doctor before taking if you have kidney problems or are on dialysis. Possible Side Effects While Using This Medicine:  
Call your doctor right away if you notice any of these side effects: 
· Rash, hives, or itching · Swelling of the face or mouth If you notice these less serious side effects, talk with your doctor: · Constipation · Dry mouth · Mild stomach cramps, diarrhea · Upset stomach, gas · Dizziness If you notice other side effects that you think are caused by this medicine, tell your doctor. Call your doctor for medical advice about side effects. You may report side effects to FDA at 2-710-FDA-7505 © 2017 [address]  Information is for End User's use only and may not be sold, redistributed or otherwise used for commercial purposes. The above information is an  only. It is not intended as medical advice for individual conditions or treatments. Talk to your doctor, nurse or pharmacist before following any medical regimen to see if it is safe and effective for you.

## 2017-09-18 NOTE — ED TRIAGE NOTES
Per EMs, patient had a near syncopal episode. Patient got light headed and dizzy. Patient's blood pressure was 88/46 . Patient received a bolus in route and blood pressure came up 114/84. No LOC at this time.

## 2017-09-19 ENCOUNTER — ANESTHESIA (OUTPATIENT)
Dept: ENDOSCOPY | Age: 32
DRG: 378 | End: 2017-09-19
Payer: SELF-PAY

## 2017-09-19 ENCOUNTER — ANESTHESIA EVENT (OUTPATIENT)
Dept: ENDOSCOPY | Age: 32
DRG: 378 | End: 2017-09-19
Payer: SELF-PAY

## 2017-09-19 VITALS
RESPIRATION RATE: 20 BRPM | DIASTOLIC BLOOD PRESSURE: 45 MMHG | OXYGEN SATURATION: 98 % | HEART RATE: 92 BPM | SYSTOLIC BLOOD PRESSURE: 109 MMHG

## 2017-09-19 LAB
ANION GAP SERPL CALC-SCNC: 5 MMOL/L (ref 3–18)
ATRIAL RATE: 85 BPM
BASOPHILS # BLD: 0.1 K/UL (ref 0–0.1)
BASOPHILS NFR BLD: 1 % (ref 0–2)
BUN SERPL-MCNC: 22 MG/DL (ref 7–18)
BUN/CREAT SERPL: 30 (ref 12–20)
CALCIUM SERPL-MCNC: 7.4 MG/DL (ref 8.5–10.1)
CALCULATED P AXIS, ECG09: 27 DEGREES
CALCULATED R AXIS, ECG10: 57 DEGREES
CALCULATED T AXIS, ECG11: 11 DEGREES
CHLORIDE SERPL-SCNC: 109 MMOL/L (ref 100–108)
CO2 SERPL-SCNC: 27 MMOL/L (ref 21–32)
CREAT SERPL-MCNC: 0.74 MG/DL (ref 0.6–1.3)
DIAGNOSIS, 93000: NORMAL
DIFFERENTIAL METHOD BLD: ABNORMAL
EOSINOPHIL # BLD: 0.2 K/UL (ref 0–0.4)
EOSINOPHIL NFR BLD: 1 % (ref 0–5)
ERYTHROCYTE [DISTWIDTH] IN BLOOD BY AUTOMATED COUNT: 15.1 % (ref 11.6–14.5)
FERRITIN SERPL-MCNC: 8 NG/ML (ref 8–388)
FOLATE SERPL-MCNC: 13.8 NG/ML (ref 3.1–17.5)
GLUCOSE SERPL-MCNC: 84 MG/DL (ref 74–99)
HCT VFR BLD AUTO: 19.7 % (ref 35–45)
HCT VFR BLD AUTO: 20.7 % (ref 35–45)
HCT VFR BLD AUTO: 21.6 % (ref 35–45)
HCT VFR BLD AUTO: 22.7 % (ref 35–45)
HGB BLD-MCNC: 6.5 G/DL (ref 12–16)
HGB BLD-MCNC: 6.7 G/DL (ref 12–16)
HGB BLD-MCNC: 7.1 G/DL (ref 12–16)
HGB BLD-MCNC: 7.5 G/DL (ref 12–16)
IRON SATN MFR SERPL: 13 %
IRON SERPL-MCNC: 40 UG/DL (ref 50–175)
LYMPHOCYTES # BLD: 3.2 K/UL (ref 0.9–3.6)
LYMPHOCYTES NFR BLD: 29 % (ref 21–52)
MCH RBC QN AUTO: 28 PG (ref 24–34)
MCHC RBC AUTO-ENTMCNC: 33 G/DL (ref 31–37)
MCV RBC AUTO: 84.9 FL (ref 74–97)
MONOCYTES # BLD: 0.4 K/UL (ref 0.05–1.2)
MONOCYTES NFR BLD: 3 % (ref 3–10)
NEUTS SEG # BLD: 7.3 K/UL (ref 1.8–8)
NEUTS SEG NFR BLD: 66 % (ref 40–73)
P-R INTERVAL, ECG05: 136 MS
PLATELET # BLD AUTO: 53 K/UL (ref 135–420)
PMV BLD AUTO: 11.9 FL (ref 9.2–11.8)
POTASSIUM SERPL-SCNC: 4 MMOL/L (ref 3.5–5.5)
Q-T INTERVAL, ECG07: 380 MS
QRS DURATION, ECG06: 86 MS
QTC CALCULATION (BEZET), ECG08: 452 MS
RBC # BLD AUTO: 2.32 M/UL (ref 4.2–5.3)
SODIUM SERPL-SCNC: 141 MMOL/L (ref 136–145)
TIBC SERPL-MCNC: 301 UG/DL (ref 250–450)
VENTRICULAR RATE, ECG03: 85 BPM
VIT B12 SERPL-MCNC: 377 PG/ML (ref 211–911)
WBC # BLD AUTO: 11.1 K/UL (ref 4.6–13.2)

## 2017-09-19 PROCEDURE — 83540 ASSAY OF IRON: CPT | Performed by: HOSPITALIST

## 2017-09-19 PROCEDURE — 82607 VITAMIN B-12: CPT | Performed by: HOSPITALIST

## 2017-09-19 PROCEDURE — 88305 TISSUE EXAM BY PATHOLOGIST: CPT | Performed by: INTERNAL MEDICINE

## 2017-09-19 PROCEDURE — 85018 HEMOGLOBIN: CPT | Performed by: INTERNAL MEDICINE

## 2017-09-19 PROCEDURE — 0DB78ZX EXCISION OF STOMACH, PYLORUS, VIA NATURAL OR ARTIFICIAL OPENING ENDOSCOPIC, DIAGNOSTIC: ICD-10-PCS | Performed by: INTERNAL MEDICINE

## 2017-09-19 PROCEDURE — 74011250637 HC RX REV CODE- 250/637: Performed by: INTERNAL MEDICINE

## 2017-09-19 PROCEDURE — 77030008565 HC TBNG SUC IRR ERBE -B: Performed by: INTERNAL MEDICINE

## 2017-09-19 PROCEDURE — 82728 ASSAY OF FERRITIN: CPT | Performed by: HOSPITALIST

## 2017-09-19 PROCEDURE — P9016 RBC LEUKOCYTES REDUCED: HCPCS | Performed by: EMERGENCY MEDICINE

## 2017-09-19 PROCEDURE — 76060000031 HC ANESTHESIA FIRST 0.5 HR: Performed by: INTERNAL MEDICINE

## 2017-09-19 PROCEDURE — 36430 TRANSFUSION BLD/BLD COMPNT: CPT

## 2017-09-19 PROCEDURE — 77030019988 HC FCPS ENDOSC DISP BSC -B: Performed by: INTERNAL MEDICINE

## 2017-09-19 PROCEDURE — 88342 IMHCHEM/IMCYTCHM 1ST ANTB: CPT | Performed by: INTERNAL MEDICINE

## 2017-09-19 PROCEDURE — 65660000000 HC RM CCU STEPDOWN

## 2017-09-19 PROCEDURE — C9113 INJ PANTOPRAZOLE SODIUM, VIA: HCPCS | Performed by: INTERNAL MEDICINE

## 2017-09-19 PROCEDURE — 80048 BASIC METABOLIC PNL TOTAL CA: CPT | Performed by: INTERNAL MEDICINE

## 2017-09-19 PROCEDURE — 74011000250 HC RX REV CODE- 250: Performed by: INTERNAL MEDICINE

## 2017-09-19 PROCEDURE — 30233N1 TRANSFUSION OF NONAUTOLOGOUS RED BLOOD CELLS INTO PERIPHERAL VEIN, PERCUTANEOUS APPROACH: ICD-10-PCS | Performed by: EMERGENCY MEDICINE

## 2017-09-19 PROCEDURE — 77030018846 HC SOL IRR STRL H20 ICUM -A: Performed by: INTERNAL MEDICINE

## 2017-09-19 PROCEDURE — 85025 COMPLETE CBC W/AUTO DIFF WBC: CPT | Performed by: INTERNAL MEDICINE

## 2017-09-19 PROCEDURE — 76040000019: Performed by: INTERNAL MEDICINE

## 2017-09-19 PROCEDURE — 36415 COLL VENOUS BLD VENIPUNCTURE: CPT | Performed by: INTERNAL MEDICINE

## 2017-09-19 PROCEDURE — 74011250636 HC RX REV CODE- 250/636: Performed by: INTERNAL MEDICINE

## 2017-09-19 PROCEDURE — 74011250636 HC RX REV CODE- 250/636

## 2017-09-19 RX ORDER — SUCRALFATE 1 G/10ML
1 SUSPENSION ORAL 4 TIMES DAILY
Status: DISCONTINUED | OUTPATIENT
Start: 2017-09-19 | End: 2017-09-20 | Stop reason: HOSPADM

## 2017-09-19 RX ORDER — SODIUM CHLORIDE 9 MG/ML
250 INJECTION, SOLUTION INTRAVENOUS AS NEEDED
Status: DISCONTINUED | OUTPATIENT
Start: 2017-09-19 | End: 2017-09-20 | Stop reason: HOSPADM

## 2017-09-19 RX ORDER — SODIUM CHLORIDE, SODIUM LACTATE, POTASSIUM CHLORIDE, CALCIUM CHLORIDE 600; 310; 30; 20 MG/100ML; MG/100ML; MG/100ML; MG/100ML
50 INJECTION, SOLUTION INTRAVENOUS CONTINUOUS
Status: DISCONTINUED | OUTPATIENT
Start: 2017-09-19 | End: 2017-09-19 | Stop reason: HOSPADM

## 2017-09-19 RX ORDER — PROPOFOL 10 MG/ML
INJECTION, EMULSION INTRAVENOUS AS NEEDED
Status: DISCONTINUED | OUTPATIENT
Start: 2017-09-19 | End: 2017-09-19 | Stop reason: HOSPADM

## 2017-09-19 RX ORDER — SODIUM CHLORIDE, SODIUM LACTATE, POTASSIUM CHLORIDE, CALCIUM CHLORIDE 600; 310; 30; 20 MG/100ML; MG/100ML; MG/100ML; MG/100ML
INJECTION, SOLUTION INTRAVENOUS
Status: DISCONTINUED | OUTPATIENT
Start: 2017-09-19 | End: 2017-09-19 | Stop reason: HOSPADM

## 2017-09-19 RX ORDER — SODIUM CHLORIDE 0.9 % (FLUSH) 0.9 %
5-10 SYRINGE (ML) INJECTION AS NEEDED
Status: DISCONTINUED | OUTPATIENT
Start: 2017-09-19 | End: 2017-09-19 | Stop reason: HOSPADM

## 2017-09-19 RX ADMIN — SODIUM CHLORIDE, SODIUM LACTATE, POTASSIUM CHLORIDE, CALCIUM CHLORIDE: 600; 310; 30; 20 INJECTION, SOLUTION INTRAVENOUS at 11:58

## 2017-09-19 RX ADMIN — SUCRALFATE 1 G: 1 SUSPENSION ORAL at 21:15

## 2017-09-19 RX ADMIN — SUCRALFATE 1 G: 1 SUSPENSION ORAL at 17:56

## 2017-09-19 RX ADMIN — PROPOFOL 50 MG: 10 INJECTION, EMULSION INTRAVENOUS at 12:03

## 2017-09-19 RX ADMIN — PROPOFOL 100 MG: 10 INJECTION, EMULSION INTRAVENOUS at 12:08

## 2017-09-19 RX ADMIN — PROPOFOL 50 MG: 10 INJECTION, EMULSION INTRAVENOUS at 12:12

## 2017-09-19 RX ADMIN — Medication 10 ML: at 22:00

## 2017-09-19 RX ADMIN — PROPOFOL 50 MG: 10 INJECTION, EMULSION INTRAVENOUS at 12:04

## 2017-09-19 RX ADMIN — Medication 10 ML: at 09:31

## 2017-09-19 RX ADMIN — SODIUM CHLORIDE 40 MG: 9 INJECTION INTRAMUSCULAR; INTRAVENOUS; SUBCUTANEOUS at 21:15

## 2017-09-19 RX ADMIN — SUCRALFATE 1 G: 1 SUSPENSION ORAL at 14:35

## 2017-09-19 RX ADMIN — SODIUM CHLORIDE 40 MG: 9 INJECTION INTRAMUSCULAR; INTRAVENOUS; SUBCUTANEOUS at 09:36

## 2017-09-19 RX ADMIN — Medication 5 ML: at 14:00

## 2017-09-19 NOTE — PROGRESS NOTES
0930 Assumed care of patient. 1115 Pt taken to Endo for EGD. 1310 Pt returned to room. 1515 Bedside shift change report given to Lorenzo Acosta (oncoming nurse) by Jd Mcacll RN   (offgoing nurse). Report included the following information SBAR, Kardex and MAR.

## 2017-09-19 NOTE — ROUTINE PROCESS
TRANSFER - OUT REPORT:    Verbal report given to DAVID Buckley on [address]  being transferred to [address] [place] [address] ([facility]) for routine progression of care       Report consisted of patients Situation, Background, Assessment and   Recommendations(SBAR). Information from the following report(s) SBAR, ED Summary and MAR was reviewed with the receiving nurse. Lines:   Peripheral IV 09/18/17 Left Antecubital (Active)   Site Assessment Clean, dry, & intact 9/18/2017  2:31 PM   Phlebitis Assessment 0 9/18/2017  2:31 PM   Infiltration Assessment 0 9/18/2017  2:31 PM   Dressing Type Tape;Transparent 9/18/2017  2:31 PM   Hub Color/Line Status Pink;Flushed;Patent 9/18/2017  2:31 PM        Opportunity for questions and clarification was provided.       Patient transported with:   Monitor  Registered Nurse

## 2017-09-19 NOTE — H&P
History and Physical      NAME:  Alan Suazo   :   1985   MRN:   [id number]     Date/Time:  2017     CHIEF COMPLAINT: dizziness     HISTORY OF PRESENT ILLNESS:     Ms. Teresa Key is a 27 y.o.   female with no significant PMH who presents with c/c of  Dizziness and near syncopal episode. She was feeling dizziness at work earlier today. She had near syncopal episode while she was walking at work. She has diaphoresis and palpitation. About  2 weeks ago she had epigastric pain and noticed black stool. But later on her stool start looking brown. She also not have any abdominal pain. Denies hematemesis. Here in ED she was found to have Hgb of 7 and repeat 6.9. She is not feeling dizzy at this time. ED physician and me discussed with her about transfusion and she wanted to think about it and see how the next blood work looks like. GI has been consulted by ED physician and she is being admitted for further evaluation and management. She denies using NSAIDs except occasionally for headache.     Past Medical History:   Diagnosis Date    Contact dermatitis and other eczema, due to unspecified cause     psoriasis    Dental caries     Psychiatric disorder     ADHD        Past Surgical History:   Procedure Laterality Date    HX CHOLECYSTECTOMY  2014    gallstones    HX GYN  2011    LEEP for mild displasia       Social History   Substance Use Topics    Smoking status: Current Every Day Smoker     Packs/day: 0.25     Years: 17.00    Smokeless tobacco: Never Used    Alcohol use Yes      Comment: social        Family History   Problem Relation Age of Onset   [employer] Stroke Mother     Hypertension Mother     Elevated Lipids Mother     Dementia Mother     Hypertension Father     Elevated Lipids Father     No Known Problems Maternal Grandmother     No Known Problems Maternal Grandfather     No Known Problems Paternal Grandmother     No Known Problems Paternal Grandfather         Allergies   Allergen Reactions    Penicillins Hives        Prior to Admission medications    Medication Sig Start Date End Date Taking? Authorizing Provider   dextroamphetamine-amphetamine (ADDERALL) 30 mg tablet Take 30 mg by mouth. Bhumi Rapp, MD   venlafaxine-SR [facility] 75 mg capsule  9/30/15   Historical Provider   cholecalciferol, vitamin D3, (VITAMIN D3) 2,000 unit tab Take  by mouth.     Historical Provider       REVIEW OF SYSTEMS:     CONSTITUTIONAL: No Fever, No chills, No weight loss, No Night sweats  HEENT:  No epistaxis, No diff in swallowing  CVS: No chest pain, No peripheral edema, No PND, No orthopnea  RS: No cough, No hemoptysis, No pleuritic chest pain  GI: No abd pain, No vomitting, No diarrhea, No hematemesis, No rectal bleeding, No acid reflux or heartburn  NEURO: No focal weakness, No headaches, No seizures  PSYCH: No anxiety, No depression  MUSCULOSKLETAL: No joint pain or swelling  : No hematuria or dysuria  SKIN: No rash      Physical Exam:    VITALS:    Vital signs reviewed; most recent are:    Visit Vitals    /65    Pulse 93    Temp 98.6 °F (37 °C)    Resp 16    SpO2 100%     SpO2 Readings from Last 6 Encounters:   09/18/17 100%   09/17/16 98%   10/07/15 98%   06/17/15 98%   06/10/15 98%   12/27/14 99%        No intake or output data in the 24 hours ending 09/18/17 2124       GENERAL: Not in acute distress  HEENT: pink conjunctiva, un icteric sclera,   NECK: No lymphadenopthy or thyroid swelling, JVD not seen  LYMPH: No supraclavicular or cervical or axillary nodes on both sides  CVS: S1S2, No murmurs, No gallop or rub  RS: CTA, No wheezing or crackles  Abd: Soft, non tender, not distended, No guarding, No rigidity  NEURO:  No focal neurologic deficits   Extrm: no leg edema or swelling   Skin: No rash      Labs:  Recent Results (from the past 24 hour(s))   CBC WITH AUTOMATED DIFF    Collection Time: 09/18/17  2:28 PM   Result Value Ref Range    WBC 9.9 4.6 - 13.2 K/uL    RBC 2.52 (L) 4.20 - 5.30 M/uL    HGB 7.0 (L) 12.0 - 16.0 g/dL    HCT 21.5 (L) 35.0 - 45.0 %    MCV 85.3 74.0 - 97.0 FL    MCH 27.8 24.0 - 34.0 PG    MCHC 32.6 31.0 - 37.0 g/dL    RDW 14.8 (H) 11.6 - 14.5 %    PLATELET 53 (L) 636 - 420 K/uL    MPV 11.5 9.2 - 11.8 FL    NEUTROPHILS 66 40 - 73 %    LYMPHOCYTES 27 21 - 52 %    MONOCYTES 4 3 - 10 %    EOSINOPHILS 2 0 - 5 %    BASOPHILS 1 0 - 2 %    ABS. NEUTROPHILS 6.6 1.8 - 8.0 K/UL    ABS. LYMPHOCYTES 2.6 0.9 - 3.6 K/UL    ABS. MONOCYTES 0.4 0.05 - 1.2 K/UL    ABS. EOSINOPHILS 0.2 0.0 - 0.4 K/UL    ABS. BASOPHILS 0.1 0.0 - 0.1 K/UL    DF AUTOMATED     METABOLIC PANEL, COMPREHENSIVE    Collection Time: 09/18/17  2:28 PM   Result Value Ref Range    Sodium 139 136 - 145 mmol/L    Potassium 3.4 (L) 3.5 - 5.5 mmol/L    Chloride 107 100 - 108 mmol/L    CO2 25 21 - 32 mmol/L    Anion gap 7 3.0 - 18 mmol/L    Glucose 104 (H) 74 - 99 mg/dL    BUN 24 (H) 7.0 - 18 MG/DL    Creatinine 0.90 0.6 - 1.3 MG/DL    BUN/Creatinine ratio 27 (H) 12 - 20      GFR est AA >60 >60 ml/min/1.73m2    GFR est non-AA >60 >60 ml/min/1.73m2    Calcium 7.4 (L) 8.5 - 10.1 MG/DL    Bilirubin, total 0.2 0.2 - 1.0 MG/DL    ALT (SGPT) 18 13 - 56 U/L    AST (SGOT) 13 (L) 15 - 37 U/L    Alk.  phosphatase 78 45 - 117 U/L    Protein, total 5.6 (L) 6.4 - 8.2 g/dL    Albumin 2.6 (L) 3.4 - 5.0 g/dL    Globulin 3.0 2.0 - 4.0 g/dL    A-G Ratio 0.9 0.8 - 1.7     EKG, 12 LEAD, INITIAL    Collection Time: 09/18/17  3:16 PM   Result Value Ref Range    Ventricular Rate 85 BPM    Atrial Rate 85 BPM    P-R Interval 136 ms    QRS Duration 86 ms    Q-T Interval 380 ms    QTC Calculation (Bezet) 452 ms    Calculated P Axis 27 degrees    Calculated R Axis 57 degrees    Calculated T Axis 11 degrees    Diagnosis       Normal sinus rhythm  Nonspecific T wave abnormality  Abnormal ECG  No previous ECGs available     CBC WITH AUTOMATED DIFF    Collection Time: 09/18/17  6:00 PM   Result Value Ref Range    WBC 14.1 (H) 4.6 - 13.2 K/uL    RBC 2.50 (L) 4.20 - 5.30 M/uL    HGB 6.9 (L) 12.0 - 16.0 g/dL    HCT 21.1 (L) 35.0 - 45.0 %    MCV 84.4 74.0 - 97.0 FL    MCH 27.6 24.0 - 34.0 PG    MCHC 32.7 31.0 - 37.0 g/dL    RDW 14.9 (H) 11.6 - 14.5 %    PLATELET 54 (L) 195 - 420 K/uL    MPV 11.5 9.2 - 11.8 FL    NEUTROPHILS 75 (H) 40 - 73 %    LYMPHOCYTES 21 21 - 52 %    MONOCYTES 2 (L) 3 - 10 %    EOSINOPHILS 1 0 - 5 %    BASOPHILS 1 0 - 2 %    ABS. NEUTROPHILS 10.7 (H) 1.8 - 8.0 K/UL    ABS. LYMPHOCYTES 2.9 0.9 - 3.6 K/UL    ABS. MONOCYTES 0.3 0.05 - 1.2 K/UL    ABS. EOSINOPHILS 0.1 0.0 - 0.4 K/UL    ABS. BASOPHILS 0.1 0.0 - 0.1 K/UL    DF AUTOMATED     HCG URINE, QL    Collection Time: 09/18/17  7:47 PM   Result Value Ref Range    HCG urine, Ql. NEGATIVE  NEG     TYPE & SCREEN    Collection Time: 09/18/17  8:30 PM   Result Value Ref Range    Crossmatch Expiration 09/21/2017     ABO/Rh(D) PENDING     Antibody screen PENDING          Active Problems:    Syncope (9/18/2017)      Anemia (9/18/2017)      UGIB (upper gastrointestinal bleed) (9/18/2017)        Assessment:       1. Symptomatic anemia  2. Suspected acute GI bleeding, probably upper  3. Near syncopal episode  4. Thrompocytopenia     Plan:       · Admit to inpatient  · GI has been consulted to Dr Dannie Pickard  · Continue IV PPI  · Monitor H/H q6  · She refuse transfusion now, she want think about it and see next blood work. · Continue tele monitoring  · D/w patient and family at the bedside  · Full code  · DVT prophylaxsis        Total time:  69 minutes             _______________________________________________________________________        Attending Physician:  Jv Whitehead MD

## 2017-09-19 NOTE — ROUTINE PROCESS
Bedside shift change report given to Joaquin Rowland RN (oncoming nurse) by Ina Hargrove (offgoing nurse). Report included the following information SBAR, Kardex, ED Summary, Procedure Summary, Intake/Output, MAR, Accordion, Recent Results and Med Rec Status.

## 2017-09-19 NOTE — PERIOP NOTES
TRANSFER - OUT REPORT:    Verbal report given to Ally Sue RN(name) on Scottie Bassett  being transferred to [address] [place] [address](unit) for routine progression of care       Report consisted of patients Situation, Background, Assessment and   Recommendations(SBAR). Information from the following report(s) SBAR, Kardex, OR Summary, Procedure Summary, Intake/Output, MAR, Recent Results and Med Rec Status was reviewed with the receiving nurse. Lines:   Peripheral IV 09/19/17 Right Wrist (Active)   Site Assessment Clean, dry, & intact 9/19/2017 11:15 AM   Phlebitis Assessment 0 9/19/2017 11:15 AM   Infiltration Assessment 0 9/19/2017 11:15 AM   Dressing Status Clean, dry, & intact 9/19/2017 11:15 AM   Dressing Type Transparent;Tape 9/19/2017 11:15 AM   Hub Color/Line Status Pink; Infusing 9/19/2017 11:15 AM   Alcohol Cap Used No 9/19/2017 11:15 AM       Peripheral IV 09/19/17 Left Hand (Active)   Site Assessment Clean, dry, & intact 9/19/2017 11:44 AM   Phlebitis Assessment 0 9/19/2017 11:44 AM   Infiltration Assessment 0 9/19/2017 11:44 AM   Dressing Status Clean, dry, & intact 9/19/2017 11:44 AM   Hub Color/Line Status Pink; Infusing 9/19/2017 11:44 AM        Opportunity for questions and clarification was provided.       Patient transported with:   [employer]

## 2017-09-19 NOTE — PROCEDURES
[facility]  [facility], [address]     Endoscopic Gastroduodenoscopy Procedure Note    Jerica Haro  1985  [id number]    Indication:  Gastrointestinal hemorrhage, unspecified     : Nikky Landis MD    Referring Provider:  None    Anesthesia/Sedation:  MAC anesthesia Propofol      Procedure Details     After infomed consent was obtained for the procedure, with all risks and benefits of procedure explained the patient was taken to the endoscopy suite and placed in the left lateral decubitus position. Following sequential administration of sedation as per above, the endoscope was inserted into the mouth and advanced under direct vision to third portion of the duodenum. A careful inspection was made as the gastroscope was withdrawn, including a retroflexed view of the proximal stomach; findings and interventions are described below. Findings:   Esophagus:hiatal hernia small in size   grade  2 reflux esophagitis  Stomach: mild erosive gastritis was noted in  fundus, body, antrum and 1 ulcer(s) 15 mm in size located in   at the angularis  Duodenum/jejunum:  mild duodenitis was located  proximal bulb, second portion    Therapies:  biopsy of stomach antrum and around ulcer. Specimens:   ID Type Source Tests Collected by Time Destination   1 : Ulcer Bx's Preservative Stomach  Nikky Landis MD 9/19/2017 1208 Pathology              Complications:   None; patient tolerated the procedure well. EBL:  None. Impression:    Esophagitis, Hiatal Hernia, Gastritis, Gastric Ulcer 1.5 cm which had dark base. No active bleeding. Duodenitis. Recommendations:  -Continue acid suppression. , -Acid suppression with a proton pump inhibitor. , -clear liquids this evening.     Nikky Landis MD  9/19/2017  12:15 PM

## 2017-09-19 NOTE — ANESTHESIA PREPROCEDURE EVALUATION
Anesthetic History   No history of anesthetic complications            Review of Systems / Medical History  Patient summary reviewed and pertinent labs reviewed    Pulmonary          Smoker         Neuro/Psych         Psychiatric history     Cardiovascular  Within defined limits                Exercise tolerance: >4 METS     GI/Hepatic/Renal     GERD: poorly controlled      PUD     Endo/Other        Obesity and anemia     Other Findings   Comments:   Risk Factors for Postoperative nausea/vomiting:       History of postoperative nausea/vomiting? NO       Female? YES       Motion sickness? NO       Intended opioid administration for postoperative analgesia? NO      Smoking Abstinence  Current Smoker? YES  Elective Surgery? YES  Seen preoperatively by anesthesiologist or proxy prior to day of surgery? YES  Pt abstained from smoking 24 hours prior to anesthesia?  YES           Physical Exam    Airway  Mallampati: I  TM Distance: > 6 cm  Neck ROM: normal range of motion   Mouth opening: Normal     Cardiovascular  Regular rate and rhythm,  S1 and S2 normal,  no murmur, click, rub, or gallop             Dental  No notable dental hx       Pulmonary  Breath sounds clear to auscultation               Abdominal  GI exam deferred       Other Findings            Anesthetic Plan    ASA: 2, emergent  Anesthesia type: MAC          Induction: Intravenous  Anesthetic plan and risks discussed with: Patient

## 2017-09-19 NOTE — ANESTHESIA POSTPROCEDURE EVALUATION
Post-Anesthesia Evaluation and Assessment    Patient: Megan Hoang MRN: [id number]  SSN: [id number]    YOB: 1985  Age: 27 y.o. Sex: female       Cardiovascular Function/Vital Signs  Visit Vitals    BP (!) 95/39    Pulse 78    Temp 36.8 °C (98.2 °F)    Resp 14    Ht 5' 5\" (1.651 m)    Wt 102.2 kg (225 lb 4.8 oz)    SpO2 99%    Breastfeeding No    BMI 37.49 kg/m2       Patient is status post MAC anesthesia for Procedure(s):  UPPER ENDOSCOPY with Bx's. Nausea/Vomiting: None    Postoperative hydration reviewed and adequate. Pain:  Pain Scale 1: Numeric (0 - 10) (09/19/17 1142)  Pain Intensity 1: 0 (09/19/17 1142)   Managed    Neurological Status:   Neuro  Neurologic State: Alert;Eyes open spontaneously (09/19/17 0163)  Orientation Level: Appropriate for age;Oriented X4 (09/19/17 3531)  Cognition: Appropriate decision making; Appropriate for age attention/concentration; Appropriate safety awareness; Follows commands (09/19/17 4625)  Speech: Clear (09/19/17 2260)  Assessment L Pupil: Brisk (09/18/17 1600)  Size L Pupil (mm): 3 (09/18/17 1600)  Assessment R Pupil: Brisk (09/18/17 1600)  Size R Pupil (mm): 3 (09/18/17 1600)  LUE Motor Response: Purposeful;Spontaneous  (09/19/17 0937)  LLE Motor Response: Purposeful;Spontaneous  (09/19/17 0937)  RUE Motor Response: Purposeful;Spontaneous  (09/19/17 2106)  RLE Motor Response: Purposeful;Spontaneous  (09/19/17 0937)   At baseline    Mental Status and Level of Consciousness: Arousable    Pulmonary Status:   O2 Device: Room air (09/19/17 1221)   Adequate oxygenation and airway patent    Complications related to anesthesia: None    Post-anesthesia assessment completed.  No concerns      Signed By: Wiliam Salazar MD     September 19, 2017

## 2017-09-19 NOTE — PROGRESS NOTES
conducted an initial consultation and Spiritual Assessment for Melonie Buckner, who is a 27 y.o.,female. Patients Primary Language is: [place]. According to the patients EMR Mandaeism Affiliation is: Kellie Coreas. The reason the Patient came to the hospital is:   Patient Active Problem List    Diagnosis Date Noted    Syncope 09/18/2017    Anemia 09/18/2017    UGIB (upper gastrointestinal bleed) 09/18/2017    Psoriasis 06/10/2015    Tobacco abuse 06/10/2015    Irregular menses 06/10/2015    Heart murmur 06/10/2015    Bilateral leg edema 06/10/2015    Morbid obesity ([facility] [address] 06/10/2015        The  provided the following Interventions:  Initiated a relationship of care and support. Explored issues of rené, belief, spirituality and Holiness/ritual needs while hospitalized. Listened empathically. Provided chaplaincy education. Provided information about Spiritual Care Services. Offered prayer and assurance of continued prayers on patient's behalf. Chart reviewed. The following outcomes where achieved:  Patient shared limited information about both their medical narrative and spiritual journey/beliefs.  confirmed Patient's Mandaeism Affiliation. Patient processed feeling about current hospitalization. Patient expressed gratitude for 's visit. Assessment:  Patient does not have any Holiness/cultural needs that will affect patients preferences in health care. There are no spiritual or Holiness issues which require intervention at this time. Plan:  Chaplains will continue to follow and will provide pastoral care on an as needed/requested basis.  recommends bedside caregivers page  on duty if patient shows signs of acute spiritual or emotional distress.     [facility]  [phone]

## 2017-09-19 NOTE — CONSULTS
[facility]  [phone]    Impression:   1. GI bleed- melena  2.symptomatic anemia- H/H 6.5/19.7 today. Blood transfusion to be started  3. H/o cholecystectomy  4. Current smoker  5. thrombocytopenia      Plan:     1. NPO  2. Cont PPI  3. EGD today      Chief Complaint: melena, dizzyness      HPI:  Mervat Coleman is a 27 y.o. female who is being seen on consult for Gi bleed, symptomatic anemia. Pt presents to ER with c/o dizziness, near syncope, palpitations and diaphoretic over 1 day. Pt experienced epigastric pain and 1 remote dark stool 2 weeks ago. No further episodes since. Pt presently denies abdominal pain, melena or hematochezia. Pt was found to have hgb7. Pt takes occasional NSAIDs for headache. Pt c/o reflux which is relieved with tums. Pt consumes alcohol occasionally. Pt denies ever having a colonoscopy or family history of colon CA. PMH:   Past Medical History:   Diagnosis Date    Contact dermatitis and other eczema, due to unspecified cause 2011    psoriasis    Dental caries     Psychiatric disorder     ADHD       PSH:   Past Surgical History:   Procedure Laterality Date    HX CHOLECYSTECTOMY  2014    gallstones    HX GYN  2011    LEEP for mild displasia       Social HX:   Social History     Social History    Marital status:      Spouse name: N/A    Number of children: N/A    Years of education: N/A     Occupational History    Not on file.      Social History Main Topics    Smoking status: Current Every Day Smoker     Packs/day: 0.25     Years: 17.00    Smokeless tobacco: Never Used    Alcohol use Yes      Comment: social    Drug use: No    Sexual activity: Yes     Partners: Male     Birth control/ protection: None     Other Topics Concern    Not on file     Social History Narrative       FHX:   Family History   Problem Relation Age of Onset    Stroke Mother     Hypertension Mother     Elevated Lipids Mother     Dementia Mother     Hypertension Father    Srini Torres Elevated Lipids Father     No Known Problems Maternal Grandmother     No Known Problems Maternal Grandfather     No Known Problems Paternal Grandmother     No Known Problems Paternal Grandfather        Allergy:   Allergies   Allergen Reactions    Penicillins Hives       Home Medications:     Prescriptions Prior to Admission   Medication Sig    dextroamphetamine-amphetamine (ADDERALL) 30 mg tablet Take 30 mg by mouth.  venlafaxine-SR (EFFEXOR-XR) 75 mg capsule     cholecalciferol, vitamin D3, (VITAMIN D3) 2,000 unit tab Take  by mouth. Review of Systems:     A fourteen point review of systems was obtained, and was negative except per HPI. Visit Vitals    /67 (BP 1 Location: Left arm, BP Patient Position: At rest)    Pulse 82    Temp 98.3 °F (36.8 °C)    Resp 20    Ht 5' 5\" (1.651 m)    Wt 102.2 kg (225 lb 4.8 oz)    SpO2 100%    Breastfeeding No    BMI 37.49 kg/m2       Physical Assessment:     constitutional: appearance: well developed, well nourished, in no acute distress. skin: no rashes, jaundice  eyes: inspection: normal conjunctivae and lids; no scleral icterus pupils: equal, round, reactive to light; extraocular movements intact  ENMT: mouth: mucous membranes moist, no thrush  neck:  no masses or tenderness; normal range of motion  respiratory: breath sounds clear, no wheeze/rales/rhonchi  cardiovascular: normal rate, regular rhythm without murmur  abdominal: soft, bowel sounds present, non-tender to palpation without rebound or guarding; no appreciable mass; no appreciable hepatosplenomegaly; no appreciable fluid wave  extremities: no clubbing, cyanosis, edema  neurologic:  Cranial nerves II-XII grossly intact; no asterixis   psychiatric:  oriented to time, space and person.         Basic Metabolic Profile   Recent Labs      09/19/17 0432   NA  141   K  4.0   CL  109*   CO2  27   BUN  22*   GLU  84   CA  7.4*         CBC w/Diff    Recent Labs      09/19/17 0432   WBC 11.1   RBC  2.32*   HGB  6.5*   HCT  19.7*   MCV  84.9   MCH  28.0   MCHC  33.0   RDW  15.1*   PLT  53*    Recent Labs      09/19/17   0432   GRANS  66   LYMPH  29   EOS  1        Hepatic Function   Recent Labs      09/18/17   1428   ALB  2.6*   TP  5.6*   TBILI  0.2   SGOT  13*   AP  78          Donavan Katz NP  [facility]  [email or web address]

## 2017-09-19 NOTE — ED NOTES
Bedside shift change report given to [address]) by Mariela Chin (offgoing nurse). Report included the following information SBAR, Kardex, ED Summary, MAR, Recent Results, Med Rec Status and Cardiac Rhythm NSR.

## 2017-09-19 NOTE — PROGRESS NOTES
[facility] Hospitalist Group  Progress Note    Patient: Mary Cortez Age: 27 y.o. : 1985 MR#: [id number] SSN: [id number]  Date/Time: 2017     Subjective: pt feels ok, no complaints   Admits using NSAIDs     Assessment/Plan:   1. Symptomatic anemia: better with transfusion. Get iron studies    2. Acute blood loss anemia: will monitor and transfuse as needed   3. Acute GI bleeding, s/p EGD show  and esophagitis: cont PPI and Carafate. D/w GI Dr. Martin Leader, start clears and adv as tolerated. 4. Near syncopal episode due to # 2  5. Thrombocytopenia: unclear, will monitor   6. NSAID'S use: adv on stop using. I spent 30 minutes with the patient in face-to-face consultation, of which greater than 50% was spent in counseling and coordination of care as described above. Case discussed with:  [x]Patient  [x]Family  [x]Nursing  []Case Management  DVT Prophylaxis:  []Lovenox  []Hep SQ  [x]SCDs  []Coumadin   []On Heparin gtt    Objective:   VS:   Visit Vitals    BP (P) 111/57    Pulse (P) 80    Temp 98.4 °F (36.9 °C)    Resp 18    Ht 5' 5\" (1.651 m)    Wt 102.2 kg (225 lb 4.8 oz)    SpO2 100%    Breastfeeding No    BMI 37.49 kg/m2      Tmax/24hrs: Temp (24hrs), Av.2 °F (36.8 °C), Min:97.3 °F (36.3 °C), Max:98.9 °F (37.2 °C)  IOBRIEF  Intake/Output Summary (Last 24 hours) at 17 1402  Last data filed at 17 1307   Gross per 24 hour   Intake            233.8 ml   Output                0 ml   Net            233.8 ml       General:  Alert, cooperative, no acute distress    Pulmonary:  CTA Bilaterally. Cardiovascular: Regular rate and Rhythm. GI:  Soft, Non distended, Non tender. + Bowel sounds. Extremities:  No edema, cyanosis, clubbing. Psych: Good insight. Not anxious or agitated. Neurologic: Alert and oriented X 3. No acute neuro deficits.   Additional:    Medications:   Current Facility-Administered Medications   Medication Dose Route Frequency  dextroamphetamine-amphetamine (ADDERALL) tablet 15 mg  15 mg Oral BID    0.9% sodium chloride infusion 250 mL  250 mL IntraVENous PRN    sucralfate (CARAFATE) 100 mg/mL oral suspension 1 g  1 g Oral QID    venlafaxine-SR (EFFEXOR-XR) capsule 75 mg  75 mg Oral DAILY WITH BREAKFAST    sodium chloride (NS) flush 5-10 mL  5-10 mL IntraVENous Q8H    sodium chloride (NS) flush 5-10 mL  5-10 mL IntraVENous PRN    ondansetron (ZOFRAN) injection 4 mg  4 mg IntraVENous Q6H PRN    pantoprazole (PROTONIX) 40 mg in sodium chloride 0.9 % 10 mL injection  40 mg IntraVENous Q12H       Labs:    Recent Results (from the past 24 hour(s))   CBC WITH AUTOMATED DIFF    Collection Time: 09/18/17  2:28 PM   Result Value Ref Range    WBC 9.9 4.6 - 13.2 K/uL    RBC 2.52 (L) 4.20 - 5.30 M/uL    HGB 7.0 (L) 12.0 - 16.0 g/dL    HCT 21.5 (L) 35.0 - 45.0 %    MCV 85.3 74.0 - 97.0 FL    MCH 27.8 24.0 - 34.0 PG    MCHC 32.6 31.0 - 37.0 g/dL    RDW 14.8 (H) 11.6 - 14.5 %    PLATELET 53 (L) 316 - 420 K/uL    MPV 11.5 9.2 - 11.8 FL    NEUTROPHILS 66 40 - 73 %    LYMPHOCYTES 27 21 - 52 %    MONOCYTES 4 3 - 10 %    EOSINOPHILS 2 0 - 5 %    BASOPHILS 1 0 - 2 %    ABS. NEUTROPHILS 6.6 1.8 - 8.0 K/UL    ABS. LYMPHOCYTES 2.6 0.9 - 3.6 K/UL    ABS. MONOCYTES 0.4 0.05 - 1.2 K/UL    ABS. EOSINOPHILS 0.2 0.0 - 0.4 K/UL    ABS.  BASOPHILS 0.1 0.0 - 0.1 K/UL    DF AUTOMATED     METABOLIC PANEL, COMPREHENSIVE    Collection Time: 09/18/17  2:28 PM   Result Value Ref Range    Sodium 139 136 - 145 mmol/L    Potassium 3.4 (L) 3.5 - 5.5 mmol/L    Chloride 107 100 - 108 mmol/L    CO2 25 21 - 32 mmol/L    Anion gap 7 3.0 - 18 mmol/L    Glucose 104 (H) 74 - 99 mg/dL    BUN 24 (H) 7.0 - 18 MG/DL    Creatinine 0.90 0.6 - 1.3 MG/DL    BUN/Creatinine ratio 27 (H) 12 - 20      GFR est AA >60 >60 ml/min/1.73m2    GFR est non-AA >60 >60 ml/min/1.73m2    Calcium 7.4 (L) 8.5 - 10.1 MG/DL    Bilirubin, total 0.2 0.2 - 1.0 MG/DL    ALT (SGPT) 18 13 - 56 U/L    AST (SGOT) 13 (L) 15 - 37 U/L    Alk. phosphatase 78 45 - 117 U/L    Protein, total 5.6 (L) 6.4 - 8.2 g/dL    Albumin 2.6 (L) 3.4 - 5.0 g/dL    Globulin 3.0 2.0 - 4.0 g/dL    A-G Ratio 0.9 0.8 - 1.7     EKG, 12 LEAD, INITIAL    Collection Time: 09/18/17  3:16 PM   Result Value Ref Range    Ventricular Rate 85 BPM    Atrial Rate 85 BPM    P-R Interval 136 ms    QRS Duration 86 ms    Q-T Interval 380 ms    QTC Calculation (Bezet) 452 ms    Calculated P Axis 27 degrees    Calculated R Axis 57 degrees    Calculated T Axis 11 degrees    Diagnosis       Normal sinus rhythm  Nonspecific T wave abnormality  Abnormal ECG  No previous ECGs available  Confirmed by Sim Riddle MD, Radha Smith ([id number]) on 9/19/2017 10:15:33 AM     CBC WITH AUTOMATED DIFF    Collection Time: 09/18/17  6:00 PM   Result Value Ref Range    WBC 14.1 (H) 4.6 - 13.2 K/uL    RBC 2.50 (L) 4.20 - 5.30 M/uL    HGB 6.9 (L) 12.0 - 16.0 g/dL    HCT 21.1 (L) 35.0 - 45.0 %    MCV 84.4 74.0 - 97.0 FL    MCH 27.6 24.0 - 34.0 PG    MCHC 32.7 31.0 - 37.0 g/dL    RDW 14.9 (H) 11.6 - 14.5 %    PLATELET 54 (L) 758 - 420 K/uL    MPV 11.5 9.2 - 11.8 FL    NEUTROPHILS 75 (H) 40 - 73 %    LYMPHOCYTES 21 21 - 52 %    MONOCYTES 2 (L) 3 - 10 %    EOSINOPHILS 1 0 - 5 %    BASOPHILS 1 0 - 2 %    ABS. NEUTROPHILS 10.7 (H) 1.8 - 8.0 K/UL    ABS. LYMPHOCYTES 2.9 0.9 - 3.6 K/UL    ABS. MONOCYTES 0.3 0.05 - 1.2 K/UL    ABS. EOSINOPHILS 0.1 0.0 - 0.4 K/UL    ABS. BASOPHILS 0.1 0.0 - 0.1 K/UL    DF AUTOMATED     HCG URINE, QL    Collection Time: 09/18/17  7:47 PM   Result Value Ref Range    HCG urine, Ql. NEGATIVE  NEG     TYPE & SCREEN    Collection Time: 09/18/17  8:30 PM   Result Value Ref Range    Crossmatch Expiration 09/21/2017     ABO/Rh(D) A NEGATIVE     Antibody screen NEG     CALLED TO: 2S A ROSER ON 09/19/2017 AT 0755 BY FIDENCIO/Jenny.      Unit number [id number]     Blood component type RC LR AS1     Unit division 00     Status of unit ISSUED     Crossmatch result Compatible    HGB & HCT Collection Time: 09/18/17 10:00 PM   Result Value Ref Range    HGB 6.9 (L) 12.0 - 16.0 g/dL    HCT 20.8 (L) 35.0 - 45.0 %   CBC WITH AUTOMATED DIFF    Collection Time: 09/19/17  4:32 AM   Result Value Ref Range    WBC 11.1 4.6 - 13.2 K/uL    RBC 2.32 (L) 4.20 - 5.30 M/uL    HGB 6.5 (L) 12.0 - 16.0 g/dL    HCT 19.7 (L) 35.0 - 45.0 %    MCV 84.9 74.0 - 97.0 FL    MCH 28.0 24.0 - 34.0 PG    MCHC 33.0 31.0 - 37.0 g/dL    RDW 15.1 (H) 11.6 - 14.5 %    PLATELET 53 (L) 836 - 420 K/uL    MPV 11.9 (H) 9.2 - 11.8 FL    NEUTROPHILS 66 40 - 73 %    LYMPHOCYTES 29 21 - 52 %    MONOCYTES 3 3 - 10 %    EOSINOPHILS 1 0 - 5 %    BASOPHILS 1 0 - 2 %    ABS. NEUTROPHILS 7.3 1.8 - 8.0 K/UL    ABS. LYMPHOCYTES 3.2 0.9 - 3.6 K/UL    ABS. MONOCYTES 0.4 0.05 - 1.2 K/UL    ABS. EOSINOPHILS 0.2 0.0 - 0.4 K/UL    ABS.  BASOPHILS 0.1 0.0 - 0.1 K/UL    DF AUTOMATED     METABOLIC PANEL, BASIC    Collection Time: 09/19/17  4:32 AM   Result Value Ref Range    Sodium 141 136 - 145 mmol/L    Potassium 4.0 3.5 - 5.5 mmol/L    Chloride 109 (H) 100 - 108 mmol/L    CO2 27 21 - 32 mmol/L    Anion gap 5 3.0 - 18 mmol/L    Glucose 84 74 - 99 mg/dL    BUN 22 (H) 7.0 - 18 MG/DL    Creatinine 0.74 0.6 - 1.3 MG/DL    BUN/Creatinine ratio 30 (H) 12 - 20      GFR est AA >60 >60 ml/min/1.73m2    GFR est non-AA >60 >60 ml/min/1.73m2    Calcium 7.4 (L) 8.5 - 10.1 MG/DL   HGB & HCT    Collection Time: 09/19/17  9:40 AM   Result Value Ref Range    HGB 6.7 (L) 12.0 - 16.0 g/dL    HCT 20.7 (L) 35.0 - 45.0 %       Signed By: Shireen Otero MD     September 19, 2017

## 2017-09-19 NOTE — PROGRESS NOTES
1500--Bedside shift change report given to KATERINA Evans (oncoming nurse) by Lupe Castro, RN (offgoing nurse). Report included the following information SBAR, MAR and Cardiac Rhythm NSR.     1900--Bedside shift change report given to Ronda Mendiola (oncoming nurse) by Marley Gilliam, DAVID (offgoing nurse). Report included the following information SBAR, MAR, Recent Results and Cardiac Rhythm NSR. Pt tolerating CLD, requesting food. Adv to Full liquid, per order.

## 2017-09-19 NOTE — PROGRESS NOTES
0015: 6.9 hgb resulted and reviewed with pt. Pt decided she wants blood transfusion/ consent obtained. Hospitalist paged. [phone]: hospitalist paged again, waiting for call.     0: Dr. Rebeka Ross returned call, awaiting next h/h before order for transfusion

## 2017-09-20 VITALS
HEART RATE: 81 BPM | OXYGEN SATURATION: 100 % | BODY MASS INDEX: 37.65 KG/M2 | RESPIRATION RATE: 18 BRPM | DIASTOLIC BLOOD PRESSURE: 79 MMHG | SYSTOLIC BLOOD PRESSURE: 116 MMHG | HEIGHT: 65 IN | WEIGHT: 226 LBS | TEMPERATURE: 97 F

## 2017-09-20 LAB
ANION GAP SERPL CALC-SCNC: 6 MMOL/L (ref 3–18)
BASOPHILS # BLD: 0.1 K/UL (ref 0–0.1)
BASOPHILS NFR BLD: 1 % (ref 0–2)
BUN SERPL-MCNC: 13 MG/DL (ref 7–18)
BUN/CREAT SERPL: 15 (ref 12–20)
CALCIUM SERPL-MCNC: 7.5 MG/DL (ref 8.5–10.1)
CHLORIDE SERPL-SCNC: 108 MMOL/L (ref 100–108)
CO2 SERPL-SCNC: 27 MMOL/L (ref 21–32)
CREAT SERPL-MCNC: 0.86 MG/DL (ref 0.6–1.3)
DIFFERENTIAL METHOD BLD: ABNORMAL
EOSINOPHIL # BLD: 0.2 K/UL (ref 0–0.4)
EOSINOPHIL NFR BLD: 2 % (ref 0–5)
ERYTHROCYTE [DISTWIDTH] IN BLOOD BY AUTOMATED COUNT: 16 % (ref 11.6–14.5)
GLUCOSE SERPL-MCNC: 94 MG/DL (ref 74–99)
HCT VFR BLD AUTO: 21.8 % (ref 35–45)
HCT VFR BLD AUTO: 22 % (ref 35–45)
HCT VFR BLD AUTO: 25 % (ref 35–45)
HGB BLD-MCNC: 7.1 G/DL (ref 12–16)
HGB BLD-MCNC: 7.2 G/DL (ref 12–16)
HGB BLD-MCNC: 8.2 G/DL (ref 12–16)
LYMPHOCYTES # BLD: 3 K/UL (ref 0.9–3.6)
LYMPHOCYTES NFR BLD: 36 % (ref 21–52)
MCH RBC QN AUTO: 27.8 PG (ref 24–34)
MCHC RBC AUTO-ENTMCNC: 32.7 G/DL (ref 31–37)
MCV RBC AUTO: 84.9 FL (ref 74–97)
MONOCYTES # BLD: 0.3 K/UL (ref 0.05–1.2)
MONOCYTES NFR BLD: 4 % (ref 3–10)
NEUTS SEG # BLD: 4.8 K/UL (ref 1.8–8)
NEUTS SEG NFR BLD: 57 % (ref 40–73)
PLATELET # BLD AUTO: 50 K/UL (ref 135–420)
PMV BLD AUTO: 12 FL (ref 9.2–11.8)
POTASSIUM SERPL-SCNC: 4.1 MMOL/L (ref 3.5–5.5)
RBC # BLD AUTO: 2.59 M/UL (ref 4.2–5.3)
SODIUM SERPL-SCNC: 141 MMOL/L (ref 136–145)
WBC # BLD AUTO: 8.3 K/UL (ref 4.6–13.2)

## 2017-09-20 PROCEDURE — 36415 COLL VENOUS BLD VENIPUNCTURE: CPT | Performed by: HOSPITALIST

## 2017-09-20 PROCEDURE — 80048 BASIC METABOLIC PNL TOTAL CA: CPT | Performed by: HOSPITALIST

## 2017-09-20 PROCEDURE — 74011250636 HC RX REV CODE- 250/636: Performed by: INTERNAL MEDICINE

## 2017-09-20 PROCEDURE — 74011000258 HC RX REV CODE- 258: Performed by: HOSPITALIST

## 2017-09-20 PROCEDURE — 74011250637 HC RX REV CODE- 250/637: Performed by: HOSPITALIST

## 2017-09-20 PROCEDURE — C9113 INJ PANTOPRAZOLE SODIUM, VIA: HCPCS | Performed by: INTERNAL MEDICINE

## 2017-09-20 PROCEDURE — P9016 RBC LEUKOCYTES REDUCED: HCPCS | Performed by: EMERGENCY MEDICINE

## 2017-09-20 PROCEDURE — 74011000250 HC RX REV CODE- 250: Performed by: INTERNAL MEDICINE

## 2017-09-20 PROCEDURE — 36430 TRANSFUSION BLD/BLD COMPNT: CPT

## 2017-09-20 PROCEDURE — 85025 COMPLETE CBC W/AUTO DIFF WBC: CPT | Performed by: HOSPITALIST

## 2017-09-20 PROCEDURE — 74011250637 HC RX REV CODE- 250/637: Performed by: INTERNAL MEDICINE

## 2017-09-20 PROCEDURE — 85018 HEMOGLOBIN: CPT | Performed by: INTERNAL MEDICINE

## 2017-09-20 PROCEDURE — 74011250636 HC RX REV CODE- 250/636: Performed by: HOSPITALIST

## 2017-09-20 RX ORDER — PANTOPRAZOLE SODIUM 40 MG/1
40 TABLET, DELAYED RELEASE ORAL
Status: DISCONTINUED | OUTPATIENT
Start: 2017-09-20 | End: 2017-09-20 | Stop reason: HOSPADM

## 2017-09-20 RX ORDER — LANOLIN ALCOHOL/MO/W.PET/CERES
325 CREAM (GRAM) TOPICAL 2 TIMES DAILY WITH MEALS
Qty: 60 TAB | Refills: 0 | Status: SHIPPED | OUTPATIENT
Start: 2017-09-20 | End: 2017-10-20

## 2017-09-20 RX ORDER — SODIUM CHLORIDE 9 MG/ML
250 INJECTION, SOLUTION INTRAVENOUS AS NEEDED
Status: DISCONTINUED | OUTPATIENT
Start: 2017-09-20 | End: 2017-09-20 | Stop reason: HOSPADM

## 2017-09-20 RX ORDER — SUCRALFATE 1 G/10ML
1 SUSPENSION ORAL 4 TIMES DAILY
Qty: 414 ML | Refills: 2 | Status: SHIPPED | OUTPATIENT
Start: 2017-09-20

## 2017-09-20 RX ORDER — ASCORBIC ACID 500 MG
500 TABLET ORAL DAILY
Qty: 30 TAB | Refills: 0 | Status: SHIPPED | OUTPATIENT
Start: 2017-09-20 | End: 2017-10-20

## 2017-09-20 RX ORDER — PANTOPRAZOLE SODIUM 40 MG/1
40 TABLET, DELAYED RELEASE ORAL
Qty: 60 TAB | Refills: 0 | Status: SHIPPED | OUTPATIENT
Start: 2017-09-20

## 2017-09-20 RX ADMIN — SUCRALFATE 1 G: 1 SUSPENSION ORAL at 12:30

## 2017-09-20 RX ADMIN — IRON DEXTRAN 25 MG: 50 INJECTION INTRAMUSCULAR; INTRAVENOUS at 13:57

## 2017-09-20 RX ADMIN — SODIUM CHLORIDE 40 MG: 9 INJECTION INTRAMUSCULAR; INTRAVENOUS; SUBCUTANEOUS at 08:12

## 2017-09-20 RX ADMIN — PANTOPRAZOLE SODIUM 40 MG: 40 TABLET, DELAYED RELEASE ORAL at 15:47

## 2017-09-20 RX ADMIN — Medication 10 ML: at 14:00

## 2017-09-20 RX ADMIN — SUCRALFATE 1 G: 1 SUSPENSION ORAL at 08:12

## 2017-09-20 RX ADMIN — SUCRALFATE 1 G: 1 SUSPENSION ORAL at 18:00

## 2017-09-20 RX ADMIN — VENLAFAXINE HYDROCHLORIDE 75 MG: 75 CAPSULE, EXTENDED RELEASE ORAL at 08:12

## 2017-09-20 RX ADMIN — SUCRALFATE 1 G: 1 SUSPENSION ORAL at 20:34

## 2017-09-20 RX ADMIN — Medication 5 ML: at 06:00

## 2017-09-20 RX ADMIN — IRON DEXTRAN 975 MG: 50 INJECTION INTRAMUSCULAR; INTRAVENOUS at 15:47

## 2017-09-20 NOTE — ROUTINE PROCESS
Received patient in bed, awake, alert and oriented. Iron dextran infusing. No complaints made, will continue to monitor. Patient for discharge after the infusion. Report  Given by  Osbaldo Díaz RN. Adiel Escalera

## 2017-09-20 NOTE — ROUTINE PROCESS
IDR/SLIDR Summary          Patient: Fuad Santos MRN: [id number]    Age: 27 y.o. YOB: 1985 Room/Bed: [facility]   Admit Diagnosis: Anemia  Syncope  UGIB (upper gastrointestinal bleed)  DX  Principal Diagnosis: <principal problem not specified>   Goals: Resolve anemia  Readmission: NO  Quality Measure: Not applicable  VTE Prophylaxis: Not needed  Influenza Vaccine screening completed? YES  Pneumococcal Vaccine screening completed? YES  Mobility needs: No   Nutrition plan:No  Consults:Case Management    Financial concerns:No  Escalated to CM? NO  RRAT Score: n/a   Interventions:H2H  Testing due for pt today? NO  LOS: 2 days Expected length of stay 3 days  Discharge plan: home   PCP: None  Transportation needs: No    Days before discharge:two or more days before discharge   Discharge disposition: Home    Signed:      Ania Dixon  9/20/2017  8:02 AM

## 2017-09-20 NOTE — PROGRESS NOTES
09/20/17 1126   Vital Signs   Temp 98.1 °F (36.7 °C)   Temp Source Oral   Pulse (Heart Rate) 73   Resp Rate 18   O2 Sat (%) 100 %   /64   MAP (Calculated) 77   BP 1 Method Automatic   BP 1 Location Left arm   BP Patient Position At rest;Supine   blood transfusion started. Patient vital signs stable. Patient had no signs or symptoms of hemolytic/anaplyactic reaction to blood. Blood transfusion running at 125 ml per hour. Will continue to monitor.

## 2017-09-20 NOTE — DISCHARGE SUMMARY
Discharge Summary    Patient: Kacie Crowder MRN: [id number]  CSN: [id number]    YOB: 1985  Age: 27 y.o. Sex: female    DOA: 9/18/2017 LOS:  LOS: 2 days   Discharge Date:      Admission Diagnoses: Anemia  Syncope  UGIB (upper gastrointestinal bleed)  DX    Discharge Diagnoses:  PLEASE SEE DICTATION. Discharge Condition: Stable    PHYSICAL EXAM  Visit Vitals    /67 (BP 1 Location: Left arm, BP Patient Position: At rest)    Pulse 78    Temp 98.5 °F (36.9 °C)    Resp 18    Ht 5' 5\" (1.651 m)    Wt 102.5 kg (226 lb)    SpO2 98%    Breastfeeding No    BMI 37.61 kg/m2       General: Alert, cooperative, no acute distress    Lungs:  CTA Bilaterally. No Wheezing/Rhonchi/Rales. Heart:  Regular rate and Rhythm. Abdomen: Soft, Non distended, Non tender. + Bowel sounds. Extremities: No edema/ cyanosis/ clubbing  Psych:   Good insight. Not anxious or agitated. Neurologic:  AA oriented X 3. Moves all extremities. No petechiae or mucosa bleeding or rash                                 Hospital Course: Please see dictation. code # [id number]. Discharge Medications:     Current Discharge Medication List      START taking these medications    Details   pantoprazole (PROTONIX) 40 mg tablet Take 1 Tab by mouth Before breakfast and dinner. Qty: 60 Tab, Refills: 0      sucralfate (CARAFATE) 100 mg/mL suspension Take 10 mL by mouth four (4) times daily. Qty: 414 mL, Refills: 2      ferrous sulfate 325 mg (65 mg iron) tablet Take 1 Tab by mouth two (2) times daily (with meals) for 30 days. Qty: 60 Tab, Refills: 0      ascorbic acid, vitamin C, (VITAMIN C) 500 mg tablet Take 1 Tab by mouth daily for 30 days. Qty: 30 Tab, Refills: 0      OTHER CBC in 1 week  Dx: Fax results to Dr. Estrella Felty: 1 Each, Refills: 0         CONTINUE these medications which have NOT CHANGED    Details   dextroamphetamine-amphetamine (ADDERALL) 30 mg tablet Take 30 mg by mouth.       venlafaxine-SR [facility] 75 mg capsule Refills: 5      cholecalciferol, vitamin D3, (VITAMIN D3) 2,000 unit tab Take  by mouth. · It is important that you take the medication exactly as they are prescribed. · Keep your medication in the bottles provided by the pharmacist and keep a list of the medication names, dosages, and times to be taken in your wallet. · Do not take other medications without consulting your doctor. DIET:  Regular Diet    ACTIVITY: Activity as tolerated    ADDITIONAL INFORMATION: If you experience any of the following symptoms but not limited to Fever, chills, nausea, vomiting, diarrhea, change in mentation, falling, bleeding, shortness of breath, chest pain, please call your primary care physician or return to the emergency room if you cannot get hold of your doctor:     FOLLOW UP CARE:  Dr. Sammy Cervantes in 7-10 days. Please call and set up an appointment.   Dr. Alyx Wilkins, hematology in 1 week  Dr. Alyx Wilkins, GI in 4 week    Minutes spent on discharge: 40 minutes spent coordinating this discharge (review instructions/follow-up, prescriptions, preparing report for sign off)    Jonathan Mascorro MD  9/20/2017 11:22 AM

## 2017-09-20 NOTE — ROUTINE PROCESS
Bedside and Verbal shift change report given to Cristina Conn RN (oncoming nurse) by Maurisio Ko (offgoing nurse). Report included the following information SBAR, Kardex, MAR and Recent Results.     SITUATION:    Code Status: Full Code   Reason for Admission: Anemia   Syncope   UGIB (upper gastrointestinal bleed)   [address] day: 2   Problem List:       Hospital Problems  Date Reviewed: 10/9/2015          Codes Class Noted POA    Syncope ICD-10-CM: R55  ICD-9-CM: 780.2  9/18/2017 Unknown        Anemia ICD-10-CM: D64.9  ICD-9-CM: 285.9  9/18/2017 Unknown        UGIB (upper gastrointestinal bleed) ICD-10-CM: K92.2  ICD-9-CM: 578.9  9/18/2017 Unknown              BACKGROUND:    Past Medical History:   Past Medical History:   Diagnosis Date    Contact dermatitis and other eczema, due to unspecified cause 2011    psoriasis    Dental caries     Psychiatric disorder     ADHD         Patient taking anticoagulants no     ASSESSMENT:    Changes in Assessment Throughout Shift: None     Patient has Central Line: no Reasons if yes: n/a   Patient has Hogan Cath: no Reasons if yes: n/a      Last Vitals:     Vitals:    09/19/17 2000 09/20/17 0000 09/20/17 0400 09/20/17 0743   BP: 106/77 93/58 104/66 105/67   Pulse: 79 81 76 78   Resp: 14 16 16 18   Temp: 98.8 °F (37.1 °C) 97.3 °F (36.3 °C) 97.4 °F (36.3 °C) 98.5 °F (36.9 °C)   SpO2: 100% 96% 99% 98%   Weight:   102.5 kg (226 lb)    Height:            IV and DRAINS (will only show if present)   [REMOVED] Peripheral IV 09/18/17 Left Antecubital-Site Assessment: Clean, dry, & intact  Peripheral IV 09/19/17 Right Wrist-Site Assessment: Clean, dry, & intact  Peripheral IV 09/19/17 Left Hand-Site Assessment: Clean, dry, & intact     WOUND (if present)   Wound Type:  none   Dressing present Dressing Present : No   Wound Concerns/Notes:  none     PAIN    Pain Assessment    Pain Intensity 1: 0 (09/20/17 0400)              Patient Stated Pain Goal: 0  o Interventions for Pain:  none  o Intervention effective: n/a  o Time of last intervention: n/a   o Reassessment Completed: yes      Last 3 Weights:  Last 3 Recorded Weights in this Encounter    09/19/17 0015 09/20/17 0400   Weight: 102.2 kg (225 lb 4.8 oz) 102.5 kg (226 lb)     Weight change: 0.318 kg (11.2 oz)     INTAKE/OUPUT    Current Shift:      Last three shifts: 09/18 1901 - 09/20 0700  In: 233.8   Out: -      LAB RESULTS     Recent Labs      09/20/17   0318  09/19/17   2140  09/19/17   1612   09/19/17   0432   09/18/17   1800   WBC  8.3   --    --    --   11.1   --   14.1*   HGB  7.2*  7.1*  7.5*   < >  6.5*   < >  6.9*   HCT  22.0*  21.6*  22.7*   < >  19.7*   < >  21.1*   PLT  50*   --    --    --   53*   --   54*    < > = values in this interval not displayed. Recent Labs      09/20/17 0318 09/19/17   0432  09/18/17   1428   NA  141  141  139   K  4.1  4.0  3.4*   GLU  94  84  104*   BUN  13  22*  24*   CREA  0.86  0.74  0.90   CA  7.5*  7.4*  7.4*       RECOMMENDATIONS AND DISCHARGE PLANNING     1. Pending tests/procedures/ Plan of Care or Other Needs: continue to monitor     2. Discharge plan for patient and Needs/Barriers: home    3. Estimated Discharge Date: 8/21/2017 Posted on Whiteboard in [facility]: yes      4. The patient's care plan was reviewed with the oncoming nurse. \"HEALS\" SAFETY CHECK      Fall Risk    Total Score: 0    Safety Measures: Safety Measures: Bed in low position, Call light within reach    A safety check occurred in the patient's room between off going nurse and oncoming nurse listed above.     The safety check included the below items  Area Items   H  High Alert Medications - Verify all high alert medication drips (heparin, PCA, etc.)   E  Equipment - Suction is set up for ALL patients (with yanker)  - Red plugs utilized for all equipment (IV pumps, etc.)  - WOWs wiped down at end of shift.  - Room stocked with oxygen, suction, and other unit-specific supplies   A  Alarms - Bed alarm is set for fall risk patients  - Ensure chair alarm is in place and activated if patient is up in a chair   L  Lines - Check IV for any infiltration  - Hogan bag is empty if patient has a Hogan   - Tubing and IV bags are labeled   S  Safety   - Room is clean, patient is clean, and equipment is clean. - Hallways are clear from equipment besides carts. - Fall bracelet on for fall risk patients  - Ensure room is clear and free of clutter  - Suction is set up for ALL patients (with crispinker)  - Hallways are clear from equipment besides carts.    - Isolation precautions followed, supplies available outside room, sign posted     Yonas Tan

## 2017-09-20 NOTE — PROGRESS NOTES
Care Management Interventions  PCP Verified by CM: Yes (pending assignment)  Palliative Care Consult (Criteria: CHF and RRAT>21): No  Reason for No Palliative Care Consult: Other (see comment) (NO ORDER)  Mode of Transport at Discharge: Other (see comment) (friend will transport)  Transition of Care Consult (CM Consult): Discharge Planning  Current Support Network: Own Home, Family Lives Nearby (lives with her signicant other)  Confirm Follow Up Transport: Friends  Plan discussed with Pt/Family/Caregiver: Yes (pt lives with her significant other, pt has agreed to follow-up with raphael Moya M.D. in one of the [place] locations)  Discharge Location  Discharge Placement: Home with family assistance   Pt will be transported home by her significant other when ready for discharge. Pt will be provided with indigent medications when ready for her discharge.

## 2017-09-20 NOTE — PROGRESS NOTES
[employer]  [phone]       Impression  1. GI bleed- no overt GI hemorrhage. EGD hiatal hernia small in size grade 2 reflux esophagitis. Stomach- mild erosive gastritis 1 ulcer. Biopsy sent  2.symptomatic anemia  3. H/o cholecystectomy  4. Current smoker  5. thrombocytopenia        Plan:  1. Cont PPI. continue monitor H/H transfuse hgb<7  2. Advance diet as tolerated. 3. F/u biopsy OP    Chief Complaint: melena, dizzyness    Subjective:  Pt uneventful overnight.  Pt denies melena, hematochezia, hematemesis        Eyes: conjunctiva normal, EOM normal   ENT: Mucous membranes moist, no lesion or thrush   Cardiovascular:  normal rate and regular rhythm, no murmur   Pulmonary/Chest Wall: breath sounds normal and effort normal   Abdominal:  soft, non-acute, non-tender, no appreciable mass or hepatosplenomegaly, no appreciable ascites       Visit Vitals    /67 (BP 1 Location: Left arm, BP Patient Position: At rest)    Pulse 78    Temp 98.5 °F (36.9 °C)    Resp 18    Ht 5' 5\" (1.651 m)    Wt 102.5 kg (226 lb)    SpO2 98%    Breastfeeding No    BMI 37.61 kg/m2           Intake/Output Summary (Last 24 hours) at 09/20/17 1001  Last data filed at 09/19/17 1411   Gross per 24 hour   Intake            233.8 ml   Output                0 ml   Net            233.8 ml       CBC w/Diff    Lab Results   Component Value Date/Time    WBC 8.3 09/20/2017 03:18 AM    RBC 2.59 (L) 09/20/2017 03:18 AM    HGB 7.2 (L) 09/20/2017 03:18 AM    HCT 22.0 (L) 09/20/2017 03:18 AM    MCV 84.9 09/20/2017 03:18 AM    MCH 27.8 09/20/2017 03:18 AM    MCHC 32.7 09/20/2017 03:18 AM    RDW 16.0 (H) 09/20/2017 03:18 AM    PLT 50 (L) 09/20/2017 03:18 AM    Lab Results   Component Value Date/Time    GRANS 57 09/20/2017 03:18 AM    LYMPH 36 09/20/2017 03:18 AM    EOS 2 09/20/2017 03:18 AM    BASOS 1 09/20/2017 03:18 AM      Basic Metabolic Profile   Recent Labs      09/20/17   0318   NA  141   K  4.1   CL  108   CO2  27   BUN  13   CA 7.5*        Hepatic Function    Lab Results   Component Value Date/Time    ALB 2.6 (L) 09/18/2017 02:28 PM    TP 5.6 (L) 09/18/2017 02:28 PM    AP 78 09/18/2017 02:28 PM    Lab Results   Component Value Date/Time    SGOT 13 (L) 09/18/2017 02:28 PM          Edita Freedman NP  [facility], [address]  [email or web address]

## 2017-09-20 NOTE — DISCHARGE INSTRUCTIONS
Anemia: Care Instructions  Your Care Instructions    Anemia is a low level of red blood cells, which carry oxygen throughout your body. Many things can cause anemia. Lack of iron is one of the most common causes. Your body needs iron to make hemoglobin, a substance in red blood cells that carries oxygen from the lungs to your body's cells. Without enough iron, the body produces fewer and smaller red blood cells. As a result, your body's cells do not get enough oxygen, and you feel tired and weak. And you may have trouble concentrating. Bleeding is the most common cause of a lack of iron. You may have heavy menstrual bleeding or bleeding caused by conditions such as ulcers, hemorrhoids, or cancer. Regular use of aspirin or other anti-inflammatory medicines (such as ibuprofen) also can cause bleeding in some people. A lack of iron in your diet also can cause anemia, especially at times when the body needs more iron, such as during pregnancy, infancy, and the teen years. Your doctor may have prescribed iron pills. It may take several months of treatment for your iron levels to return to normal. Your doctor also may suggest that you eat foods that are rich in iron, such as meat and beans. There are many other causes of anemia. It is not always due to a lack of iron. Finding the specific cause of your anemia will help your doctor find the right treatment for you. Follow-up care is a key part of your treatment and safety. Be sure to make and go to all appointments, and call your doctor if you are having problems. It's also a good idea to know your test results and keep a list of the medicines you take. How can you care for yourself at home? · Take your medicines exactly as prescribed. Call your doctor if you think you are having a problem with your medicine. · If your doctor recommends iron pills, take them as directed:  ¨ Try to take the pills on an empty stomach about 1 hour before or 2 hours after meals. But you may need to take iron with food to avoid an upset stomach. ¨ Do not take antacids or drink milk or caffeine drinks (such as coffee, tea, or cola) at the same time or within 2 hours of the time that you take your iron. They can make it hard for your body to absorb the iron. ¨ Vitamin C (from food or supplements) helps your body absorb iron. Try taking iron pills with a glass of orange juice or some other food that is high in vitamin C, such as citrus fruits. ¨ Iron pills may cause stomach problems, such as heartburn, nausea, diarrhea, constipation, and cramps. Be sure to drink plenty of fluids, and include fruits, vegetables, and fiber in your diet each day. Iron pills often make your bowel movements dark or green. ¨ If you forget to take an iron pill, do not take a double dose of iron the next time you take a pill. ¨ Keep iron pills out of the reach of small children. An overdose of iron can be very dangerous. · Follow your doctor's advice about eating iron-rich foods. These include red meat, shellfish, poultry, eggs, beans, raisins, whole-grain bread, and leafy green vegetables. · Steam vegetables to help them keep their iron content. When should you call for help? Call 911 anytime you think you may need emergency care. For example, call if:  · You have symptoms of a heart attack. These may include:  ¨ Chest pain or pressure, or a strange feeling in the chest.  ¨ Sweating. ¨ Shortness of breath. ¨ Nausea or vomiting. ¨ Pain, pressure, or a strange feeling in the back, neck, jaw, or upper belly or in one or both shoulders or arms. ¨ Lightheadedness or sudden weakness. ¨ A fast or irregular heartbeat. After you call 911, the  may tell you to chew 1 adult-strength or 2 to 4 low-dose aspirin. Wait for an ambulance. Do not try to drive yourself. · You passed out (lost consciousness).   Call your doctor now or seek immediate medical care if:  · You have new or increased shortness of breath. · You are dizzy or lightheaded, or you feel like you may faint. · Your fatigue and weakness continue or get worse. · You have any abnormal bleeding, such as:  ¨ Nosebleeds. ¨ Vaginal bleeding that is different (heavier, more frequent, at a different time of the month) than what you are used to. ¨ Bloody or black stools, or rectal bleeding. ¨ Bloody or pink urine. Watch closely for changes in your health, and be sure to contact your doctor if:  · You do not get better as expected. Where can you learn more? Go to http://zeke-néstor.info/. Enter R301 in the search box to learn more about \"Anemia: Care Instructions. \"  Current as of: October 13, 2016  Content Version: 11.3  © 3343-5094 [employer]. Care instructions adapted under license by [employer] (which disclaims liability or warranty for this information). If you have questions about a medical condition or this instruction, always ask your healthcare professional. [place] [address] any warranty or liability for your use of this information. Gastrointestinal Bleeding: Care Instructions  Your Care Instructions    The digestive or gastrointestinal tract goes from the mouth to the anus. It is often called the GI tract. Bleeding can happen anywhere in the GI tract. It may be caused by an ulcer, an infection, or cancer. It may also be caused by medicines such as aspirin or ibuprofen. Light bleeding may not cause any symptoms at first. But if you continue to bleed for a while, you may feel very weak or tired. Sudden, heavy bleeding means you need to see a doctor right away. This kind of bleeding can be very dangerous. But it can usually be cured or controlled. The doctor may do some tests to find the cause of your bleeding. Follow-up care is a key part of your treatment and safety. Be sure to make and go to all appointments, and call your doctor if you are having problems.  It's also a good idea to know your test results and keep a list of the medicines you take. How can you care for yourself at home? · Be safe with medicines. Take your medicines exactly as prescribed. Call your doctor if you think you are having a problem with your medicine. You will get more details on the specific medicines your doctor prescribes. · Do not take aspirin or other anti-inflammatory medicines, such as naproxen (Aleve) or ibuprofen (Advil, Motrin), without talking to your doctor first. Ask your doctor if it is okay to use acetaminophen (Tylenol). · Do not drink alcohol. · The bleeding may make you lose iron. So it's important to eat foods that have a lot of iron. These include red meat, shellfish, poultry, and eggs. They also include beans, raisins, whole-grain breads, and leafy green vegetables. If you want help planning meals, you can make an appointment with a dietitian. When should you call for help? Call 911 anytime you think you may need emergency care. For example, call if:  · You have sudden, severe belly pain. · You vomit blood or what looks like coffee grounds. · You passed out (lost consciousness). · Your stools are maroon or very bloody. Call your doctor now or seek immediate medical care if:  · You are dizzy or lightheaded, or you feel like you may faint. · Your stools are black and look like tar, or they have streaks of blood. · You have belly pain. · You vomit or have nausea. · You have trouble swallowing, or it hurts when you swallow. Watch closely for changes in your health, and be sure to contact your doctor if:  · You do not get better as expected. Where can you learn more? Go to http://zeke-néstor.info/. Enter Y517 in the search box to learn more about \"Gastrointestinal Bleeding: Care Instructions. \"  Current as of: March 20, 2017  Content Version: 11.3  © 7063-9755 [employer].  Care instructions adapted under license by [facility] (which disclaims liability or warranty for this information). If you have questions about a medical condition or this instruction, always ask your healthcare professional. [address] any warranty or liability for your use of this information. Fainting: Care Instructions  Your Care Instructions    When you faint, or pass out, you lose consciousness for a short time. A brief drop in blood flow to the brain often causes it. When you fall or lie down, more blood flows to your brain and you regain consciousness. Emotional stress, pain, or overheating--especially if you have been standing--can make you faint. In these cases, fainting is usually not serious. But fainting can be a sign of a more serious problem. Your doctor may want you to have more tests to rule out other causes. The treatment you need depends on the reason why you fainted. The doctor has checked you carefully, but problems can develop later. If you notice any problems or new symptoms, get medical treatment right away. Follow-up care is a key part of your treatment and safety. Be sure to make and go to all appointments, and call your doctor if you are having problems. It's also a good idea to know your test results and keep a list of the medicines you take. How can you care for yourself at home? · Drink plenty of fluids to prevent dehydration. If you have kidney, heart, or liver disease and have to limit fluids, talk with your doctor before you increase your fluid intake. When should you call for help? Call 911 anytime you think you may need emergency care. For example, call if:  · You have symptoms of a heart problem. These may include:  ¨ Chest pain or pressure. ¨ Severe trouble breathing. ¨ A fast or irregular heartbeat. ¨ Lightheadedness or sudden weakness. ¨ Coughing up pink, foamy mucus. ¨ Passing out.   After you call 911, the  may tell you to chew 1 adult-strength or 2 to 4 low-dose aspirin. Wait for an ambulance. Do not try to drive yourself. · You have symptoms of a stroke. These may include:  ¨ Sudden numbness, tingling, weakness, or loss of movement in your face, arm, or leg, especially on only one side of your body. ¨ Sudden vision changes. ¨ Sudden trouble speaking. ¨ Sudden confusion or trouble understanding simple statements. ¨ Sudden problems with walking or balance. ¨ A sudden, severe headache that is different from past headaches. · You passed out (lost consciousness) again. Watch closely for changes in your health, and be sure to contact your doctor if:  · You do not get better as expected. Where can you learn more? Go to http://zeke-néstor.info/. Enter Z472 in the search box to learn more about \"Fainting: Care Instructions. \"  Current as of: March 20, 2017  Content Version: 11.3  © 9984-9742 [employer]. Care instructions adapted under license by [employer] (which disclaims liability or warranty for this information). If you have questions about a medical condition or this instruction, always ask your healthcare professional. [place] [address] any warranty or liability for your use of this information. Patient armband removed and shredded  MyChart Activation    Thank you for requesting access to [email or web address]. Please follow the instructions below to securely access and download your online medical record. [email or web address] allows you to send messages to your doctor, view your test results, renew your prescriptions, schedule appointments, and more. How Do I Sign Up? 1. In your internet browser, go to [email or web address]  2. Click on the First Time User? Click Here link in the Sign In box. You will be redirect to the New Member Sign Up page. 3. Enter your [email or web address] Access Code exactly as it appears below. You will not need to use this code after youve completed the sign-up process.  If you do not sign up before the expiration date, you must request a new code. TakeCare Access Code: [email or web address]  Expires: 2017 11:03 AM (This is the date your TakeCare access code will )    4. Enter the last four digits of your Social Security Number (xxxx) and Date of Birth (mm/dd/yyyy) as indicated and click Submit. You will be taken to the next sign-up page. 5. Create a TakeCare ID. This will be your TakeCare login ID and cannot be changed, so think of one that is secure and easy to remember. 6. Create a TakeCare password. You can change your password at any time. 7. Enter your Password Reset Question and Answer. This can be used at a later time if you forget your password. 8. Enter your e-mail address. You will receive e-mail notification when new information is available in [address] 9. Click Sign Up. You can now view and download portions of your medical record. 10. Click the Download Summary menu link to download a portable copy of your medical information. Additional Information    If you have questions, please visit the Frequently Asked Questions section of the TakeCare website at [email or web address] Remember, TakeCare is NOT to be used for urgent needs. For medical emergencies, dial 911. DISCHARGE SUMMARY from Nurse    The following personal items are in your possession at time of discharge:    Dental Appliances: None  Visual Aid: None     Home Medications: None  Jewelry: None  Clothing: Footwear, Pants, Shirt  Other Valuables: Cell Phone             PATIENT INSTRUCTIONS:    After general anesthesia or intravenous sedation, for 24 hours or while taking prescription Narcotics:  · Limit your activities  · Do not drive and operate hazardous machinery  · Do not make important personal or business decisions  · Do  not drink alcoholic beverages  · If you have not urinated within 8 hours after discharge, please contact your surgeon on call.     Report the following to your surgeon:  · Excessive pain, swelling, redness or odor of or around the surgical area  · Temperature over 100.5  · Nausea and vomiting lasting longer than 4 hours or if unable to take medications  · Any signs of decreased circulation or nerve impairment to extremity: change in color, persistent  numbness, tingling, coldness or increase pain  · Any questions        What to do at Home:  Recommended activity: Activity as tolerated    If you experience any of the following symptoms weakness, light-headiness, blood in stool, blood in urine, unexplained bleeding, nausea, vomiting, diarrhea, constipation, chest pains, short of breath please follow up with PCP. *  Please give a list of your current medications to your Primary Care Provider. *  Please update this list whenever your medications are discontinued, doses are      changed, or new medications (including over-the-counter products) are added. *  Please carry medication information at all times in case of emergency situations. These are general instructions for a healthy lifestyle:    No smoking/ No tobacco products/ Avoid exposure to second hand smoke    Surgeon General's Warning:  Quitting smoking now greatly reduces serious risk to your health. Obesity, smoking, and sedentary lifestyle greatly increases your risk for illness    A healthy diet, regular physical exercise & weight monitoring are important for maintaining a healthy lifestyle    You may be retaining fluid if you have a history of heart failure or if you experience any of the following symptoms:  Weight gain of 3 pounds or more overnight or 5 pounds in a week, increased swelling in our hands or feet or shortness of breath while lying flat in bed. Please call your doctor as soon as you notice any of these symptoms; do not wait until your next office visit.     Recognize signs and symptoms of STROKE:    F-face looks uneven    A-arms unable to move or move unevenly    S-speech slurred or non-existent    T-time-call 911 as soon as signs and symptoms begin-DO NOT go       Back to bed or wait to see if you get better-TIME IS BRAIN. Warning Signs of HEART ATTACK     Call 911 if you have these symptoms:   Chest discomfort. Most heart attacks involve discomfort in the center of the chest that lasts more than a few minutes, or that goes away and comes back. It can feel like uncomfortable pressure, squeezing, fullness, or pain.  Discomfort in other areas of the upper body. Symptoms can include pain or discomfort in one or both arms, the back, neck, jaw, or stomach.  Shortness of breath with or without chest discomfort.  Other signs may include breaking out in a cold sweat, nausea, or lightheadedness. Don't wait more than five minutes to call 911 - MINUTES MATTER! Fast action can save your life. Calling 911 is almost always the fastest way to get lifesaving treatment. Emergency Medical Services staff can begin treatment when they arrive -- up to an hour sooner than if someone gets to the hospital by car. The discharge information has been reviewed with the patient. The patient verbalized understanding. Discharge medications reviewed with the patient and appropriate educational materials and side effects teaching were provided. Discharge Instructions    Patient: Anna Palacio MRN: [id number]  CSN: [id number]    YOB: 1985  Age: 27 y.o. Sex: female    DOA: 9/18/2017 LOS:  LOS: 2 days   Discharge Date:      DIET:  Regular Diet    ACTIVITY: Activity as tolerated    ADDITIONAL INFORMATION: If you experience any of the following symptoms but not limited to Fever, chills, nausea, vomiting, diarrhea, change in mentation, falling, bleeding, shortness of breath, chest pain, please call your primary care physician or return to the emergency room if you cannot get hold of your doctor:     FOLLOW UP CARE:  Dr. Romeo Marquez in 7-10 days.  Please call and set up an appointment.   Dr. Tim Guadarrama, hematology in 1 week  Dr. Tim Guadarrama GI in 4 week      Panchito Billy MD  9/20/2017 11:16 AM

## 2017-09-21 LAB
ABO + RH BLD: NORMAL
BLD PROD TYP BPU: NORMAL
BLD PROD TYP BPU: NORMAL
BLOOD GROUP ANTIBODIES SERPL: NORMAL
BPU ID: NORMAL
BPU ID: NORMAL
CALLED TO:,BCALL1: NORMAL
CALLED TO:,BCALL2: NORMAL
CROSSMATCH RESULT,%XM: NORMAL
CROSSMATCH RESULT,%XM: NORMAL
PERIPHERAL SMEAR,PSM: NORMAL
SPECIMEN EXP DATE BLD: NORMAL
STATUS OF UNIT,%ST: NORMAL
STATUS OF UNIT,%ST: NORMAL
UNIT DIVISION, %UDIV: 0
UNIT DIVISION, %UDIV: 0

## 2017-09-21 NOTE — DISCHARGE SUMMARY
[address] [place] [place] SUMMARY    Name:  Anurag Sandoval  MR#:  [id number]  :  1985  Account #:  [de-identified]  Date of Adm:  2017  Date of Discharge:  2017      PRIMARY CARE PHYSICIAN: Does not have one, will be following  with Sanjeev with [employer], Dr. Lori Gambleite. DISPOSITION: Discharged to home. DISCHARGE CONDITION: Stable. DISCHARGE DIAGNOSES  1. Symptomatic anemia secondary to gastrointestinal bleed. 2. Acute blood loss anemia. 3. Acute gastrointestinal bleed due to gastric ulcer and esophagitis,  status post esophagogastroduodenoscopy. 4. Near syncopal episode due to symptomatic anemia. 5. Thrombocytopenia, unclear etiology, needs outpatient followup with  Hematology. 6. Nonsteroidal anti-inflammatory drug use, advised cessation. DISCHARGE MEDICATIONS  1. Vitamin C 500 mg daily. 2. Ferrous sulfate 325 mg b.i.d.  3. Protonix 40 mg b.i.d.  4. Carafate 1 g 4 times daily. 5. Adderall 30 mg daily. 6. Effexor 75 mg daily. 7. Vitamin D3, 2000 international units daily. CONSULTATIONS IN THE HOSPITAL: Consultation with Dr. Peg Servin. MAJOR PROCEDURES IN THE HOSPITAL: The patient underwent  upper GI endoscopy, which showed esophagitis, hiatal hernia, gastritis,  gastric ulcer no active bleeding, and duodenitis. HOSPITAL COURSE: This is a 78-year-old  female who  presents to the emergency room with dizziness. She was noted to have  acute blood loss anemia with hemoglobin that dropped to 6.9. The  patient was admitted to the hospital and GI was consulted. The patient  was started on PPI IV. The patient was seen by GI, who recommended  upper GI endoscopy. The patient initially declined blood transfusion,  but later agreed. She got a unit of blood transfusion and she had upper  GI endoscopy, which showed gastric ulcer along with esophagitis and  duodenitis. GI recommended to start clear liquids and advance as  tolerated. Recommended continue PPI and monitor overnight. The  patient overnight was monitored in the hospital. Hemoglobin still was  on the lower side, so received 1 more unit of blood transfusion after  which hemoglobin is stable and is 8.2. The patient did not have any  further melena. She has chronic thrombocytopenia, unclear etiology. Peripheral smear was ordered, but was not resulted yet. Discussed  with oncologist/hematologist, Dr. Darlene Wilcox, who recommends that since  the patient did not have any symptoms okayed for discharge and she  will follow up as an outpatient. The patient's iron studies showed very  severe iron-deficiency anemia so IV iron has been given in the  hospital. The patient is currently hemodynamically and medically stable  for discharge. The patient will be discharged home today. For discharge instructions, followup appointments and physical exam,  please refer to the electronic medical records. The patient is alert, awake, oriented x3. I discussed with the patient  about discharge plan and followup appointments. Also, discussed with  the patient about new medications, side effects of new medications,  and also discussed with her about followup with Hematology and PCP  and also advised about signs monitoring for bleeding and also for  petechial hemorrhages. She completely understood and agreed with  the plan. I also answered all her questions and concerns appropriately.         Ramon Alonzo MD BT / [place]  D:  09/20/2017   18:20  T:  09/21/2017   11:09  Job #:  [id number]

## 2017-09-21 NOTE — ROUTINE PROCESS
Patient discharge with accompanied by family, Awake, alert and oriented ambulating Discharge notes and instructions given.

## (undated) DEVICE — AIRLIFE™ NASAL OXYGEN CANNULA CURVED, FLARED TIP WITH 14 FOOT (4.3 M) CRUSH-RESISTANT TUBING, OVER-THE-EAR STYLE: Brand: AIRLIFE™

## (undated) DEVICE — MEDI-VAC SUCTION HIGH CAPACITY: Brand: CARDINAL HEALTH

## (undated) DEVICE — FCPS RAD JAW 4LC 240CM W/NDL -- BX/20 RADIAL JAW 4

## (undated) DEVICE — BITE BLOCK ENDOSCP UNIV AD 6 TO 9.4 MM

## (undated) DEVICE — MEDI-VAC NON-CONDUCTIVE SUCTION TUBING: Brand: CARDINAL HEALTH

## (undated) DEVICE — STERILE POLYISOPRENE POWDER-FREE SURGICAL GLOVES: Brand: PROTEXIS

## (undated) DEVICE — CATHETER SUCT TR FL TIP 14FR W/ O CTRL

## (undated) DEVICE — ENDOSCOPY PUMP TUBING/ CAP SET: Brand: ERBE

## (undated) DEVICE — FLEX ADVANTAGE 3000CC: Brand: FLEX ADVANTAGE

## (undated) DEVICE — SOLUTION IRRIG 1000ML H2O STRL BLT

## (undated) DEVICE — GAUZE SPONGES,16 PLY: Brand: CURITY

## (undated) DEVICE — SYR 50ML SLIP TIP NSAF LF STRL --

## (undated) DEVICE — (D)GLOVE EXAM LG NITRL NS -- DISC BY MFR NO SUB

## (undated) DEVICE — SYRINGE MED 25GA 3ML L5/8IN SUBQ PLAS W/ DETACH NDL SFTY

## (undated) DEVICE — FLUFF AND POLYMER UNDERPAD,EXTRA HEAVY: Brand: WINGS

## (undated) DEVICE — BASIN EMESIS 500CC ROSE 250/CS 60/PLT: Brand: MEDEGEN MEDICAL PRODUCTS, LLC